# Patient Record
Sex: FEMALE | Race: WHITE | NOT HISPANIC OR LATINO | URBAN - METROPOLITAN AREA
[De-identification: names, ages, dates, MRNs, and addresses within clinical notes are randomized per-mention and may not be internally consistent; named-entity substitution may affect disease eponyms.]

---

## 2017-01-01 ENCOUNTER — COMMUNICATION - HEALTHEAST (OUTPATIENT)
Dept: VASCULAR SURGERY | Facility: CLINIC | Age: 67
End: 2017-01-01

## 2017-01-01 ENCOUNTER — COMMUNICATION - HEALTHEAST (OUTPATIENT)
Dept: FAMILY MEDICINE | Facility: CLINIC | Age: 67
End: 2017-01-01

## 2017-01-01 ENCOUNTER — RECORDS - HEALTHEAST (OUTPATIENT)
Dept: ADMINISTRATIVE | Facility: OTHER | Age: 67
End: 2017-01-01

## 2017-01-01 ENCOUNTER — OFFICE VISIT - HEALTHEAST (OUTPATIENT)
Dept: CARDIOLOGY | Facility: CLINIC | Age: 67
End: 2017-01-01

## 2017-01-01 ENCOUNTER — HOSPITAL ENCOUNTER (OUTPATIENT)
Dept: CARDIOLOGY | Facility: CLINIC | Age: 67
Discharge: HOME | End: 2017-06-29
Attending: SURGERY

## 2017-01-01 ENCOUNTER — OFFICE VISIT - HEALTHEAST (OUTPATIENT)
Dept: VASCULAR SURGERY | Facility: CLINIC | Age: 67
End: 2017-01-01

## 2017-01-01 ENCOUNTER — COMMUNICATION - HEALTHEAST (OUTPATIENT)
Dept: CARDIOLOGY | Facility: CLINIC | Age: 67
End: 2017-01-01

## 2017-01-01 ENCOUNTER — OFFICE VISIT - HEALTHEAST (OUTPATIENT)
Dept: FAMILY MEDICINE | Facility: CLINIC | Age: 67
End: 2017-01-01

## 2017-01-01 ENCOUNTER — RECORDS - HEALTHEAST (OUTPATIENT)
Dept: VASCULAR ULTRASOUND | Facility: CLINIC | Age: 67
End: 2017-01-01

## 2017-01-01 ENCOUNTER — COMMUNICATION - HEALTHEAST (OUTPATIENT)
Dept: CARDIAC REHAB | Facility: CLINIC | Age: 67
End: 2017-01-01

## 2017-01-01 ENCOUNTER — ANESTHESIA - HEALTHEAST (OUTPATIENT)
Dept: SURGERY | Facility: CLINIC | Age: 67
End: 2017-01-01

## 2017-01-01 ENCOUNTER — HOSPITAL ENCOUNTER (OUTPATIENT)
Dept: RADIOLOGY | Facility: CLINIC | Age: 67
Discharge: HOME | End: 2017-08-25
Attending: INTERNAL MEDICINE

## 2017-01-01 ENCOUNTER — AMBULATORY - HEALTHEAST (OUTPATIENT)
Dept: CARDIOLOGY | Facility: CLINIC | Age: 67
End: 2017-01-01

## 2017-01-01 ENCOUNTER — OFFICE VISIT - HEALTHEAST (OUTPATIENT)
Dept: BEHAVIORAL HEALTH | Facility: CLINIC | Age: 67
End: 2017-01-01

## 2017-01-01 ENCOUNTER — SURGERY - HEALTHEAST (OUTPATIENT)
Dept: CARDIOLOGY | Facility: CLINIC | Age: 67
End: 2017-01-01

## 2017-01-01 ENCOUNTER — AMBULATORY - HEALTHEAST (OUTPATIENT)
Dept: FAMILY MEDICINE | Facility: CLINIC | Age: 67
End: 2017-01-01

## 2017-01-01 ENCOUNTER — HOSPITAL ENCOUNTER (OUTPATIENT)
Dept: INTERVENTIONAL RADIOLOGY/VASCULAR | Facility: HOSPITAL | Age: 67
Discharge: HOME | End: 2017-07-28
Attending: SURGERY

## 2017-01-01 ENCOUNTER — HOSPITAL ENCOUNTER (OUTPATIENT)
Dept: CT IMAGING | Facility: CLINIC | Age: 67
Discharge: HOME | End: 2017-06-29
Attending: SURGERY

## 2017-01-01 ENCOUNTER — COMMUNICATION - HEALTHEAST (OUTPATIENT)
Dept: NURSING | Facility: CLINIC | Age: 67
End: 2017-01-01

## 2017-01-01 ENCOUNTER — SURGERY - HEALTHEAST (OUTPATIENT)
Dept: SURGERY | Facility: CLINIC | Age: 67
End: 2017-01-01

## 2017-01-01 ENCOUNTER — AMBULATORY - HEALTHEAST (OUTPATIENT)
Dept: VASCULAR SURGERY | Facility: CLINIC | Age: 67
End: 2017-01-01

## 2017-01-01 DIAGNOSIS — Z98.890 POST-OPERATIVE STATE: ICD-10-CM

## 2017-01-01 DIAGNOSIS — I25.5 ISCHEMIC CARDIOMYOPATHY: ICD-10-CM

## 2017-01-01 DIAGNOSIS — E78.5 HYPERLIPIDEMIA: ICD-10-CM

## 2017-01-01 DIAGNOSIS — S81.001A OPEN KNEE WOUND, RIGHT, INITIAL ENCOUNTER: ICD-10-CM

## 2017-01-01 DIAGNOSIS — I73.9 PERIPHERAL VASCULAR DISEASE, UNSPECIFIED (H): ICD-10-CM

## 2017-01-01 DIAGNOSIS — I77.1 STENOSIS OF LEFT SUBCLAVIAN ARTERY (H): ICD-10-CM

## 2017-01-01 DIAGNOSIS — Z95.828 S/P INSERTION OF ILIAC ARTERY STENT: ICD-10-CM

## 2017-01-01 DIAGNOSIS — T81.49XA SURGICAL WOUND INFECTION: ICD-10-CM

## 2017-01-01 DIAGNOSIS — E78.5 DYSLIPIDEMIA, GOAL LDL BELOW 70: ICD-10-CM

## 2017-01-01 DIAGNOSIS — L29.9 ITCHING: ICD-10-CM

## 2017-01-01 DIAGNOSIS — Z51.89 VISIT FOR WOUND CHECK: ICD-10-CM

## 2017-01-01 DIAGNOSIS — E11.51 TYPE 2 DIABETES MELLITUS WITH DIABETIC PERIPHERAL ANGIOPATHY WITHOUT GANGRENE, WITH LONG-TERM CURRENT USE OF INSULIN (H): ICD-10-CM

## 2017-01-01 DIAGNOSIS — Z86.718 PERSONAL HISTORY OF OTHER VENOUS THROMBOSIS AND EMBOLISM: ICD-10-CM

## 2017-01-01 DIAGNOSIS — I25.10 CORONARY ARTERY DISEASE DUE TO LIPID RICH PLAQUE: ICD-10-CM

## 2017-01-01 DIAGNOSIS — Z87.891 HISTORY OF SMOKING: ICD-10-CM

## 2017-01-01 DIAGNOSIS — I74.3 EMBOLIC DISEASE OF TOE (H): ICD-10-CM

## 2017-01-01 DIAGNOSIS — Z79.4 TYPE 2 DIABETES MELLITUS WITH OTHER CIRCULATORY COMPLICATION, WITH LONG-TERM CURRENT USE OF INSULIN (H): ICD-10-CM

## 2017-01-01 DIAGNOSIS — R06.02 SOB (SHORTNESS OF BREATH): ICD-10-CM

## 2017-01-01 DIAGNOSIS — Z95.1 S/P CABG X 4: ICD-10-CM

## 2017-01-01 DIAGNOSIS — I73.9 PAD (PERIPHERAL ARTERY DISEASE) (H): ICD-10-CM

## 2017-01-01 DIAGNOSIS — E11.59 TYPE 2 DIABETES MELLITUS WITH OTHER CIRCULATORY COMPLICATIONS (H): ICD-10-CM

## 2017-01-01 DIAGNOSIS — Z86.718: ICD-10-CM

## 2017-01-01 DIAGNOSIS — I73.9 CLAUDICATION (H): ICD-10-CM

## 2017-01-01 DIAGNOSIS — I20.0 UNSTABLE ANGINA (H): ICD-10-CM

## 2017-01-01 DIAGNOSIS — S31.109D WOUND OF LEFT GROIN, SUBSEQUENT ENCOUNTER: ICD-10-CM

## 2017-01-01 DIAGNOSIS — I25.83 CORONARY ARTERY DISEASE DUE TO LIPID RICH PLAQUE: ICD-10-CM

## 2017-01-01 DIAGNOSIS — F33.1 MODERATE EPISODE OF RECURRENT MAJOR DEPRESSIVE DISORDER (H): ICD-10-CM

## 2017-01-01 DIAGNOSIS — Z95.1 S/P CABG X 3: ICD-10-CM

## 2017-01-01 DIAGNOSIS — I49.5 SINUS NODE DYSFUNCTION (H): ICD-10-CM

## 2017-01-01 DIAGNOSIS — R06.00 DYSPNEA, UNSPECIFIED TYPE: ICD-10-CM

## 2017-01-01 DIAGNOSIS — R06.02 SHORTNESS OF BREATH: ICD-10-CM

## 2017-01-01 DIAGNOSIS — Z79.4 TYPE 2 DIABETES MELLITUS WITH DIABETIC PERIPHERAL ANGIOPATHY WITHOUT GANGRENE, WITH LONG-TERM CURRENT USE OF INSULIN (H): ICD-10-CM

## 2017-01-01 DIAGNOSIS — S31.109D RIGHT GROIN WOUND, SUBSEQUENT ENCOUNTER: ICD-10-CM

## 2017-01-01 DIAGNOSIS — E87.8 ELECTROLYTE ABNORMALITY: ICD-10-CM

## 2017-01-01 DIAGNOSIS — S31.109A RIGHT GROIN WOUND: ICD-10-CM

## 2017-01-01 DIAGNOSIS — I50.21 ACUTE SYSTOLIC HEART FAILURE (H): ICD-10-CM

## 2017-01-01 DIAGNOSIS — T81.328A STERNAL WOUND DEHISCENCE, INITIAL ENCOUNTER: ICD-10-CM

## 2017-01-01 DIAGNOSIS — D62 ACUTE BLOOD LOSS AS CAUSE OF POSTOPERATIVE ANEMIA: ICD-10-CM

## 2017-01-01 DIAGNOSIS — Z51.89 ENCOUNTER FOR WOUND RE-CHECK: ICD-10-CM

## 2017-01-01 DIAGNOSIS — E11.59 TYPE 2 DIABETES MELLITUS WITH OTHER CIRCULATORY COMPLICATION, WITH LONG-TERM CURRENT USE OF INSULIN (H): ICD-10-CM

## 2017-01-01 DIAGNOSIS — T81.321A ABDOMINAL WOUND DEHISCENCE, INITIAL ENCOUNTER: ICD-10-CM

## 2017-01-01 DIAGNOSIS — I42.9 CARDIOMYOPATHY (H): ICD-10-CM

## 2017-01-01 DIAGNOSIS — L85.3 XEROSIS CUTIS: ICD-10-CM

## 2017-01-01 DIAGNOSIS — Z98.890 HISTORY OF EMBOLECTOMY: ICD-10-CM

## 2017-01-01 LAB
AORTIC ROOT: 2.9 CM
AORTIC VALVE MEAN VELOCITY: 88.3 CM/S
ASCENDING AORTA: 3.3 CM
ATRIAL RATE - MUSE: 65 BPM
AV DIMENSIONLESS INDEX VTI: 0.5
AV MEAN GRADIENT: 3 MMHG
AV VALVE AREA: 1.3 CM2
BSA FOR ECHO PROCEDURE: 1.85 M2
DIASTOLIC BLOOD PRESSURE - MUSE: NORMAL MMHG
DOP CALC AO PEAK VEL: 126 CM/S
DOP CALC AO PEAK VEL: 126 CM/S
DOP CALC AO VTI: 30.7 CM
DOP CALC LVOT AREA: 2.83 CM2
DOP CALC LVOT DIAMETER: 1.9 CM
DOP CALC LVOT PEAK VEL: 64.7 CM/S
DOP CALC LVOT STROKE VOLUME: 40.8 CM3
DOP CALCLVOT PEAK VEL VTI: 14.4 CM
ECHO EJECTION FRACTION ESTIMATED: 40 %
EJECTION FRACTION: 34 % (ref 55–75)
FRACTIONAL SHORTENING: 25 % (ref 28–44)
HBA1C MFR BLD: 6.9 % (ref 3.5–6)
INTERPRETATION ECG - MUSE: NORMAL
INTERVENTRICULAR SEPTUM IN END DIASTOLE: 1.3 CM (ref 0.6–0.9)
IVS/PW RATIO: 1
LA AREA 1: 18.4 CM2
LA AREA 2: 18.2 CM2
LEFT ATRIUM LENGTH: 5.12 CM
LEFT ATRIUM SIZE: 3.2 CM
LEFT ATRIUM VOLUME INDEX: 30.1 ML/M2
LEFT ATRIUM VOLUME: 55.6 CM3
LEFT VENTRICLE DIASTOLIC VOLUME INDEX: 61.6 CM3/M2 (ref 34–74)
LEFT VENTRICLE DIASTOLIC VOLUME: 114 CM3 (ref 46–106)
LEFT VENTRICLE MASS INDEX: 150.5 G/M2
LEFT VENTRICLE SYSTOLIC VOLUME INDEX: 40.5 CM3/M2 (ref 11–31)
LEFT VENTRICLE SYSTOLIC VOLUME: 75 CM3 (ref 14–42)
LEFT VENTRICULAR INTERNAL DIMENSION IN DIASTOLE: 5.2 CM (ref 3.8–5.2)
LEFT VENTRICULAR INTERNAL DIMENSION IN SYSTOLE: 3.9 CM (ref 2.2–3.5)
LEFT VENTRICULAR MASS: 278.4 G
LEFT VENTRICULAR OUTFLOW TRACT MEAN GRADIENT: 1 MMHG
LEFT VENTRICULAR OUTFLOW TRACT MEAN VELOCITY: 42.2 CM/S
LEFT VENTRICULAR OUTFLOW TRACT PEAK GRADIENT: 2 MMHG
LEFT VENTRICULAR POSTERIOR WALL IN END DIASTOLE: 1.3 CM (ref 0.6–0.9)
LV STROKE VOLUME INDEX: 22.1 ML/M2
MV DECELERATION TIME: 243 MS
MV DECELERATION TIME: 243 MS
MV E'TISSUE VEL-LAT: 2.92 CM/S
MV E'TISSUE VEL-LAT: 2.92 CM/S
MV E'TISSUE VEL-MED: 4.58 CM/S
MV E'TISSUE VEL-MED: 4.58 CM/S
MV PEAK A VELOCITY: 108 CM/S
MV PEAK A VELOCITY: 108 CM/S
MV PEAK E VELOCITY: 82.4 CM/S
MV PEAK E VELOCITY: 82.4 CM/S
P AXIS - MUSE: NORMAL DEGREES
PR INTERVAL - MUSE: 126 MS
QRS DURATION - MUSE: 100 MS
QT - MUSE: 442 MS
QTC - MUSE: 459 MS
R AXIS - MUSE: 38 DEGREES
SYSTOLIC BLOOD PRESSURE - MUSE: NORMAL MMHG
T AXIS - MUSE: 163 DEGREES
TRICUSPID VALVE ANULAR PLANE SYSTOLIC EXCURSION: 1.1 CM
VENTRICULAR RATE- MUSE: 65 BPM

## 2017-01-01 RX ORDER — ALBUTEROL SULFATE 90 UG/1
2 AEROSOL, METERED RESPIRATORY (INHALATION) EVERY 6 HOURS PRN
Qty: 18 G | Refills: 11 | Status: SHIPPED | OUTPATIENT
Start: 2017-01-01

## 2017-01-01 ASSESSMENT — MIFFLIN-ST. JEOR
SCORE: 1329.21
SCORE: 1294.74
SCORE: 1294.74
SCORE: 1322.4
SCORE: 1399.97
SCORE: 1309.71
SCORE: 1308.35
SCORE: 1347.92
SCORE: 1301.09
SCORE: 1368.22
SCORE: 1333.29
SCORE: 1301.54
SCORE: 1262.99
SCORE: 1309.71
SCORE: 1301.54
SCORE: 1382.13
SCORE: 1399.97
SCORE: 1381.13
SCORE: 1342.82
SCORE: 1368.22
SCORE: 1396.13
SCORE: 1336.47
SCORE: 1306.99
SCORE: 1396.13
SCORE: 1309.25
SCORE: 1381.13
SCORE: 1306.53
SCORE: 1362.78
SCORE: 1279.32
SCORE: 1252.1
SCORE: 1382.13
SCORE: 1309.25
SCORE: 1381.13
SCORE: 1362.78
SCORE: 1347.92
SCORE: 1301.09
SCORE: 1407.13
SCORE: 1407.13
SCORE: 1357.79
SCORE: 1301.09
SCORE: 1315.6
SCORE: 1308.35
SCORE: 1368.22
SCORE: 1369.58
SCORE: 1280.68
SCORE: 1326.04
SCORE: 1301.54
SCORE: 1342.82
SCORE: 1336.47
SCORE: 1399.97
SCORE: 1330.06
SCORE: 1353.25
SCORE: 1294.74
SCORE: 1339.65
SCORE: 1259.81
SCORE: 1339.65
SCORE: 1309.71
SCORE: 1353.71
SCORE: 1357.79
SCORE: 1306.99
SCORE: 1262.08
SCORE: 1396.13
SCORE: 1326.04
SCORE: 1353.71
SCORE: 1321.5
SCORE: 1326.04
SCORE: 1309.25
SCORE: 1330.06
SCORE: 1306.99
SCORE: 1308.35
SCORE: 1347.92
SCORE: 1330.06
SCORE: 1353.71
SCORE: 1407.13
SCORE: 1370.04
SCORE: 1362.78
SCORE: 1382.13
SCORE: 1336.47
SCORE: 1321.5
SCORE: 1342.82
SCORE: 1321.5
SCORE: 1357.79
SCORE: 1339.65

## 2017-01-27 ENCOUNTER — COMMUNICATION - HEALTHEAST (OUTPATIENT)
Dept: FAMILY MEDICINE | Facility: CLINIC | Age: 67
End: 2017-01-27

## 2017-01-27 DIAGNOSIS — I25.10 ATHEROSCLEROSIS OF NATIVE CORONARY ARTERY OF NATIVE HEART WITHOUT ANGINA PECTORIS: ICD-10-CM

## 2017-02-03 ENCOUNTER — COMMUNICATION - HEALTHEAST (OUTPATIENT)
Dept: FAMILY MEDICINE | Facility: CLINIC | Age: 67
End: 2017-02-03

## 2017-02-03 DIAGNOSIS — E78.5 HYPERLIPIDEMIA: ICD-10-CM

## 2017-02-03 DIAGNOSIS — F32.A DEPRESSION: ICD-10-CM

## 2017-03-12 ENCOUNTER — COMMUNICATION - HEALTHEAST (OUTPATIENT)
Dept: FAMILY MEDICINE | Facility: CLINIC | Age: 67
End: 2017-03-12

## 2017-03-20 ENCOUNTER — COMMUNICATION - HEALTHEAST (OUTPATIENT)
Dept: FAMILY MEDICINE | Facility: CLINIC | Age: 67
End: 2017-03-20

## 2017-03-20 DIAGNOSIS — E11.9 DIABETES (H): ICD-10-CM

## 2017-03-20 DIAGNOSIS — F33.2 SEVERE EPISODE OF RECURRENT MAJOR DEPRESSIVE DISORDER (H): ICD-10-CM

## 2017-03-20 DIAGNOSIS — E11.9 TYPE 2 DIABETES MELLITUS (H): ICD-10-CM

## 2017-03-29 ENCOUNTER — OFFICE VISIT - HEALTHEAST (OUTPATIENT)
Dept: FAMILY MEDICINE | Facility: CLINIC | Age: 67
End: 2017-03-29

## 2017-03-29 ENCOUNTER — AMBULATORY - HEALTHEAST (OUTPATIENT)
Dept: FAMILY MEDICINE | Facility: CLINIC | Age: 67
End: 2017-03-29

## 2017-03-29 DIAGNOSIS — E11.59 TYPE 2 DIABETES MELLITUS WITH OTHER CIRCULATORY COMPLICATION, WITH LONG-TERM CURRENT USE OF INSULIN (H): ICD-10-CM

## 2017-03-29 DIAGNOSIS — Z79.4 TYPE 2 DIABETES MELLITUS WITH OTHER CIRCULATORY COMPLICATION, WITH LONG-TERM CURRENT USE OF INSULIN (H): ICD-10-CM

## 2017-03-29 DIAGNOSIS — I25.10 ATHEROSCLEROSIS OF NATIVE CORONARY ARTERY OF NATIVE HEART WITHOUT ANGINA PECTORIS: ICD-10-CM

## 2017-03-29 DIAGNOSIS — F33.2 SEVERE EPISODE OF RECURRENT MAJOR DEPRESSIVE DISORDER (H): ICD-10-CM

## 2017-03-29 DIAGNOSIS — J98.8 CONGESTION OF UPPER AIRWAY: ICD-10-CM

## 2017-03-29 DIAGNOSIS — M19.90 OSTEOARTHRITIS: ICD-10-CM

## 2017-03-29 LAB
CHOLEST SERPL-MCNC: 145 MG/DL
FASTING STATUS PATIENT QL REPORTED: YES
HBA1C MFR BLD: 7.6 % (ref 3.5–6)
HDLC SERPL-MCNC: 47 MG/DL
LDLC SERPL CALC-MCNC: 79 MG/DL
TRIGL SERPL-MCNC: 95 MG/DL

## 2017-03-29 ASSESSMENT — MIFFLIN-ST. JEOR: SCORE: 1350.98

## 2017-03-29 NOTE — ASSESSMENT & PLAN NOTE
Has seen psychiatry in the distant past. I think she would benefit from consulting with them again, as here depression sx of moderately severe.  If they find a good regimen for her, I would be happy to write ongoing prescriptions in the future.  I will continue current meds until she sees psych.

## 2017-03-29 NOTE — ASSESSMENT & PLAN NOTE
Stable.  S/pcardiology study, now no longer on meds for this.  Continue asa, bp control, statin. She is a non-smoker.

## 2017-03-30 ENCOUNTER — COMMUNICATION - HEALTHEAST (OUTPATIENT)
Dept: FAMILY MEDICINE | Facility: CLINIC | Age: 67
End: 2017-03-30

## 2017-04-18 ENCOUNTER — OFFICE VISIT - HEALTHEAST (OUTPATIENT)
Dept: BEHAVIORAL HEALTH | Facility: CLINIC | Age: 67
End: 2017-04-18

## 2017-04-18 DIAGNOSIS — F33.1 MODERATE EPISODE OF RECURRENT MAJOR DEPRESSIVE DISORDER (H): ICD-10-CM

## 2017-10-27 NOTE — ASSESSMENT & PLAN NOTE
Clinically stable.  BNP is still high, but in general is decreasing. Checking labs,clarified meds.  Was out of Plavix.

## 2018-01-01 ENCOUNTER — COMMUNICATION - HEALTHEAST (OUTPATIENT)
Dept: FAMILY MEDICINE | Facility: CLINIC | Age: 68
End: 2018-01-01

## 2018-01-01 ENCOUNTER — COMMUNICATION - HEALTHEAST (OUTPATIENT)
Dept: HOME HEALTH SERVICES | Facility: HOME HEALTH | Age: 68
End: 2018-01-01

## 2018-01-01 ENCOUNTER — HOME CARE/HOSPICE - HEALTHEAST (OUTPATIENT)
Dept: HOME HEALTH SERVICES | Facility: HOME HEALTH | Age: 68
End: 2018-01-01

## 2018-01-01 ENCOUNTER — HOME CARE/HOSPICE - HEALTHEAST (OUTPATIENT)
Dept: HOSPICE | Facility: HOSPICE | Age: 68
End: 2018-01-01

## 2018-01-01 ENCOUNTER — COMMUNICATION - HEALTHEAST (OUTPATIENT)
Dept: SCHEDULING | Facility: CLINIC | Age: 68
End: 2018-01-01

## 2018-01-01 ENCOUNTER — RECORDS - HEALTHEAST (OUTPATIENT)
Dept: INTENSIVE CARE | Facility: CLINIC | Age: 68
End: 2018-01-01

## 2018-01-01 ENCOUNTER — ANESTHESIA - HEALTHEAST (OUTPATIENT)
Dept: INTENSIVE CARE | Facility: CLINIC | Age: 68
End: 2018-01-01

## 2018-01-01 ENCOUNTER — OFFICE VISIT - HEALTHEAST (OUTPATIENT)
Dept: FAMILY MEDICINE | Facility: CLINIC | Age: 68
End: 2018-01-01

## 2018-01-01 ENCOUNTER — AMBULATORY - HEALTHEAST (OUTPATIENT)
Dept: FAMILY MEDICINE | Facility: CLINIC | Age: 68
End: 2018-01-01

## 2018-01-01 ENCOUNTER — COMMUNICATION - HEALTHEAST (OUTPATIENT)
Dept: OCCUPATIONAL THERAPY | Facility: CLINIC | Age: 68
End: 2018-01-01

## 2018-01-01 ENCOUNTER — SURGERY - HEALTHEAST (OUTPATIENT)
Dept: CARDIOLOGY | Facility: CLINIC | Age: 68
End: 2018-01-01

## 2018-01-01 DIAGNOSIS — M79.606 LEG PAIN: ICD-10-CM

## 2018-01-01 DIAGNOSIS — N61.0 CELLULITIS OF BREAST: ICD-10-CM

## 2018-01-01 DIAGNOSIS — R53.83 FATIGUE: ICD-10-CM

## 2018-01-01 DIAGNOSIS — R41.3 MEMORY LOSS DUE TO MEDICAL CONDITION: ICD-10-CM

## 2018-01-01 DIAGNOSIS — F33.2 SEVERE EPISODE OF RECURRENT MAJOR DEPRESSIVE DISORDER, WITHOUT PSYCHOTIC FEATURES (H): ICD-10-CM

## 2018-01-01 DIAGNOSIS — Z79.4 TYPE 2 DIABETES MELLITUS WITH DIABETIC PERIPHERAL ANGIOPATHY WITHOUT GANGRENE, WITH LONG-TERM CURRENT USE OF INSULIN (H): ICD-10-CM

## 2018-01-01 DIAGNOSIS — E11.51 TYPE 2 DIABETES MELLITUS WITH DIABETIC PERIPHERAL ANGIOPATHY WITHOUT GANGRENE, WITH LONG-TERM CURRENT USE OF INSULIN (H): ICD-10-CM

## 2018-01-01 DIAGNOSIS — Z91.148 POOR COMPLIANCE WITH MEDICATION: ICD-10-CM

## 2018-01-01 DIAGNOSIS — F32.A DEPRESSIVE DISORDER: ICD-10-CM

## 2018-01-01 DIAGNOSIS — F33.2 SEVERE EPISODE OF RECURRENT MAJOR DEPRESSIVE DISORDER (H): ICD-10-CM

## 2018-01-01 DIAGNOSIS — S99.929A INJURY OF TOE: ICD-10-CM

## 2018-01-01 DIAGNOSIS — F32.A DEPRESSION: ICD-10-CM

## 2018-01-01 DIAGNOSIS — E78.5 HYPERLIPIDEMIA: ICD-10-CM

## 2018-01-01 DIAGNOSIS — M79.604 PAIN IN BOTH LOWER EXTREMITIES: ICD-10-CM

## 2018-01-01 DIAGNOSIS — D64.9 ANEMIA: ICD-10-CM

## 2018-01-01 DIAGNOSIS — S99.922A INJURY OF TOE ON LEFT FOOT, INITIAL ENCOUNTER: ICD-10-CM

## 2018-01-01 DIAGNOSIS — M79.605 PAIN IN BOTH LOWER EXTREMITIES: ICD-10-CM

## 2018-01-01 LAB
ALBUMIN SERPL-MCNC: 3 G/DL (ref 3.5–5)
ALP SERPL-CCNC: 164 U/L (ref 45–120)
ALT SERPL W P-5'-P-CCNC: 123 U/L (ref 0–45)
ANION GAP SERPL CALCULATED.3IONS-SCNC: 10 MMOL/L (ref 5–18)
AST SERPL W P-5'-P-CCNC: 28 U/L (ref 0–40)
BASOPHILS # BLD AUTO: 0 THOU/UL (ref 0–0.2)
BASOPHILS NFR BLD AUTO: 0 % (ref 0–2)
BILIRUB DIRECT SERPL-MCNC: 0.3 MG/DL
BILIRUB SERPL-MCNC: 0.6 MG/DL (ref 0–1)
BUN SERPL-MCNC: 23 MG/DL (ref 8–22)
CALCIUM SERPL-MCNC: 8.2 MG/DL (ref 8.5–10.5)
CHLORIDE BLD-SCNC: 98 MMOL/L (ref 98–107)
CO2 SERPL-SCNC: 27 MMOL/L (ref 22–31)
CREAT SERPL-MCNC: 0.89 MG/DL (ref 0.6–1.1)
EOSINOPHIL # BLD AUTO: 0.1 THOU/UL (ref 0–0.4)
EOSINOPHIL NFR BLD AUTO: 1 % (ref 0–6)
ERYTHROCYTE [DISTWIDTH] IN BLOOD BY AUTOMATED COUNT: 15.8 % (ref 11–14.5)
ERYTHROCYTE [DISTWIDTH] IN BLOOD BY AUTOMATED COUNT: 19.5 % (ref 11–14.5)
FERRITIN SERPL-MCNC: 32 NG/ML (ref 10–130)
GFR SERPL CREATININE-BSD FRML MDRD: >60 ML/MIN/1.73M2
GLUCOSE BLD-MCNC: 62 MG/DL (ref 70–125)
HBA1C MFR BLD: 8.1 % (ref 3.5–6)
HCT VFR BLD AUTO: 27.4 % (ref 35–47)
HCT VFR BLD AUTO: 27.7 % (ref 35–47)
HGB BLD-MCNC: 8.3 G/DL (ref 12–16)
HGB BLD-MCNC: 8.8 G/DL (ref 12–16)
LAB AP CHARGES (HE HISTORICAL CONVERSION): NORMAL
LYMPHOCYTES # BLD AUTO: 1.2 THOU/UL (ref 0.8–4.4)
LYMPHOCYTES NFR BLD AUTO: 12 % (ref 20–40)
MCH RBC QN AUTO: 22.5 PG (ref 27–34)
MCH RBC QN AUTO: 22.8 PG (ref 27–34)
MCHC RBC AUTO-ENTMCNC: 30 G/DL (ref 32–36)
MCHC RBC AUTO-ENTMCNC: 32 G/DL (ref 32–36)
MCV RBC AUTO: 70 FL (ref 80–100)
MCV RBC AUTO: 76 FL (ref 80–100)
MONOCYTES # BLD AUTO: 0.6 THOU/UL (ref 0–0.9)
MONOCYTES NFR BLD AUTO: 6 % (ref 2–10)
NEUTROPHILS # BLD AUTO: 8.2 THOU/UL (ref 2–7.7)
NEUTROPHILS NFR BLD AUTO: 81 % (ref 50–70)
OVALOCYTES: ABNORMAL
PATH REPORT.COMMENTS IMP SPEC: NORMAL
PATH REPORT.COMMENTS IMP SPEC: NORMAL
PATH REPORT.FINAL DX SPEC: NORMAL
PATH REPORT.MICROSCOPIC SPEC OTHER STN: ABNORMAL
PATH REPORT.RELEVANT HX SPEC: NORMAL
PLAT MORPH BLD: NORMAL
PLATELET # BLD AUTO: 349 THOU/UL (ref 140–440)
PLATELET # BLD AUTO: 355 THOU/UL (ref 140–440)
PMV BLD AUTO: 10.6 FL (ref 8.5–12.5)
PMV BLD AUTO: 8.1 FL (ref 7–10)
POTASSIUM BLD-SCNC: 3.5 MMOL/L (ref 3.5–5)
PROT SERPL-MCNC: 6.3 G/DL (ref 6–8)
RBC # BLD AUTO: 3.64 MILL/UL (ref 3.8–5.4)
RBC # BLD AUTO: 3.88 MILL/UL (ref 3.8–5.4)
SODIUM SERPL-SCNC: 135 MMOL/L (ref 136–145)
TSH SERPL DL<=0.005 MIU/L-ACNC: 0.59 UIU/ML (ref 0.3–5)
VIT B12 SERPL-MCNC: >2000 PG/ML (ref 213–816)
WBC: 10.3 THOU/UL (ref 4–11)
WBC: 9.6 THOU/UL (ref 4–11)

## 2018-01-01 ASSESSMENT — MIFFLIN-ST. JEOR
SCORE: 1207.65
SCORE: 1279.77
SCORE: 1216.72
SCORE: 1198.58
SCORE: 1260.72
SCORE: 1214

## 2018-04-13 NOTE — ASSESSMENT & PLAN NOTE
Inadequate diabetes control, as noted in medication noncompliance problem. Homecare can help evaluate.  Daughter wonders about oral meds, but she is already on a lotof pills and she had been very successfully controlled when compliant in the past.  Will see how things go once homecare initiated.    I did send Lantus pen instead of vials today, hoping that will make insulin easier.

## 2018-04-13 NOTE — ASSESSMENT & PLAN NOTE
Some of the patient's symptoms may be due to severe uncontrolled depression today.  She is on an unusual regimen of Cymbalta plus Effexor and may benefit from a switch.  At like to see how everything goes when her anemia, etc. are addressed and other causes ruled out.  Therefore, no meds changed today.

## 2018-04-13 NOTE — ASSESSMENT & PLAN NOTE
Multifactorial today with diabetic neuropathy, peripheral vascular dz, and now toenail injury.  She inquires about medical marijuana, which could be considered. I will discuss with our pharmacist regarding options, given her current medication regimen.

## 2018-04-13 NOTE — ASSESSMENT & PLAN NOTE
Suspect iron deficiency from hemorrhoidal blood loss and possibly never got replaced after acute blood loss anemia last fall. Recommend starting OTC iron supplement Floradix. Daughter given info and will .

## 2021-05-25 ENCOUNTER — RECORDS - HEALTHEAST (OUTPATIENT)
Dept: ADMINISTRATIVE | Facility: CLINIC | Age: 71
End: 2021-05-25

## 2021-05-27 ENCOUNTER — RECORDS - HEALTHEAST (OUTPATIENT)
Dept: ADMINISTRATIVE | Facility: CLINIC | Age: 71
End: 2021-05-27

## 2021-05-28 ENCOUNTER — RECORDS - HEALTHEAST (OUTPATIENT)
Dept: ADMINISTRATIVE | Facility: CLINIC | Age: 71
End: 2021-05-28

## 2021-05-29 ENCOUNTER — RECORDS - HEALTHEAST (OUTPATIENT)
Dept: ADMINISTRATIVE | Facility: CLINIC | Age: 71
End: 2021-05-29

## 2021-05-30 ENCOUNTER — RECORDS - HEALTHEAST (OUTPATIENT)
Dept: ADMINISTRATIVE | Facility: CLINIC | Age: 71
End: 2021-05-30

## 2021-05-30 VITALS — HEIGHT: 63 IN | WEIGHT: 188.5 LBS | BODY MASS INDEX: 33.4 KG/M2

## 2021-05-31 ENCOUNTER — RECORDS - HEALTHEAST (OUTPATIENT)
Dept: ADMINISTRATIVE | Facility: CLINIC | Age: 71
End: 2021-05-31

## 2021-05-31 VITALS — HEIGHT: 63 IN | BODY MASS INDEX: 30.53 KG/M2 | WEIGHT: 172.3 LBS

## 2021-05-31 VITALS — BODY MASS INDEX: 32.45 KG/M2 | WEIGHT: 183.19 LBS

## 2021-05-31 VITALS — WEIGHT: 178.1 LBS | BODY MASS INDEX: 31.55 KG/M2

## 2021-05-31 VITALS — BODY MASS INDEX: 30.48 KG/M2 | WEIGHT: 172 LBS | HEIGHT: 63 IN

## 2021-05-31 VITALS — HEIGHT: 63 IN | BODY MASS INDEX: 31.54 KG/M2 | WEIGHT: 178 LBS

## 2021-05-31 VITALS — HEIGHT: 63 IN | WEIGHT: 168.2 LBS | BODY MASS INDEX: 29.8 KG/M2

## 2021-05-31 VITALS — WEIGHT: 185.3 LBS | BODY MASS INDEX: 32.82 KG/M2

## 2021-05-31 VITALS — WEIGHT: 198.6 LBS | BODY MASS INDEX: 35.18 KG/M2

## 2021-05-31 VITALS
HEIGHT: 63 IN | BODY MASS INDEX: 33.94 KG/M2 | BODY MASS INDEX: 32.77 KG/M2 | BODY MASS INDEX: 32.95 KG/M2 | HEIGHT: 63 IN | BODY MASS INDEX: 32.77 KG/M2 | WEIGHT: 186 LBS | WEIGHT: 191.6 LBS

## 2021-05-31 VITALS — WEIGHT: 194.45 LBS | BODY MASS INDEX: 34.44 KG/M2

## 2021-05-31 VITALS
WEIGHT: 183.19 LBS | HEIGHT: 63 IN | BODY MASS INDEX: 32.46 KG/M2 | HEIGHT: 63 IN | BODY MASS INDEX: 32.46 KG/M2 | HEIGHT: 63 IN | BODY MASS INDEX: 32.46 KG/M2 | WEIGHT: 183.19 LBS | WEIGHT: 183.19 LBS

## 2021-05-31 VITALS — BODY MASS INDEX: 31.64 KG/M2 | WEIGHT: 178.6 LBS

## 2021-05-31 VITALS — BODY MASS INDEX: 33.37 KG/M2 | WEIGHT: 188.4 LBS

## 2021-05-31 VITALS — BODY MASS INDEX: 31.6 KG/M2 | WEIGHT: 176.9 LBS | BODY MASS INDEX: 31.34 KG/M2 | WEIGHT: 178.4 LBS

## 2021-05-31 VITALS — WEIGHT: 178.7 LBS | BODY MASS INDEX: 31.66 KG/M2

## 2021-05-31 VITALS — WEIGHT: 175.4 LBS | BODY MASS INDEX: 31.07 KG/M2

## 2021-05-31 VITALS — BODY MASS INDEX: 34.02 KG/M2 | HEIGHT: 63 IN | WEIGHT: 192 LBS

## 2021-05-31 VITALS — WEIGHT: 184.6 LBS | BODY MASS INDEX: 32.7 KG/M2

## 2021-05-31 VITALS — WEIGHT: 182.3 LBS | BODY MASS INDEX: 32.29 KG/M2

## 2021-05-31 VITALS — BODY MASS INDEX: 29.41 KG/M2 | HEIGHT: 63 IN | WEIGHT: 166 LBS

## 2021-05-31 VITALS — BODY MASS INDEX: 33.53 KG/M2 | WEIGHT: 189.3 LBS

## 2021-05-31 VITALS — BODY MASS INDEX: 34.04 KG/M2 | WEIGHT: 185 LBS | HEIGHT: 62 IN

## 2021-05-31 VITALS — WEIGHT: 194.67 LBS | BODY MASS INDEX: 34.48 KG/M2

## 2021-05-31 VITALS — HEIGHT: 63 IN | WEIGHT: 180 LBS | BODY MASS INDEX: 31.89 KG/M2

## 2021-05-31 VITALS — WEIGHT: 197.75 LBS | BODY MASS INDEX: 35.03 KG/M2

## 2021-05-31 VITALS — HEIGHT: 63 IN | WEIGHT: 183 LBS | BODY MASS INDEX: 32.43 KG/M2

## 2021-05-31 VITALS
HEIGHT: 63 IN | BODY MASS INDEX: 34.48 KG/M2 | HEIGHT: 63 IN | BODY MASS INDEX: 35.46 KG/M2 | WEIGHT: 200.18 LBS | BODY MASS INDEX: 34.48 KG/M2

## 2021-05-31 VITALS — WEIGHT: 190.4 LBS | BODY MASS INDEX: 33.73 KG/M2

## 2021-05-31 VITALS — WEIGHT: 187.1 LBS | BODY MASS INDEX: 34.43 KG/M2 | HEIGHT: 62 IN

## 2021-05-31 VITALS — BODY MASS INDEX: 31.32 KG/M2 | WEIGHT: 176.8 LBS

## 2021-05-31 VITALS — WEIGHT: 167.7 LBS | HEIGHT: 63 IN | BODY MASS INDEX: 29.71 KG/M2

## 2021-05-31 VITALS — BODY MASS INDEX: 29.84 KG/M2 | HEIGHT: 63 IN | WEIGHT: 168.4 LBS

## 2021-05-31 VITALS — WEIGHT: 181.3 LBS | BODY MASS INDEX: 32.12 KG/M2

## 2021-05-31 VITALS — BODY MASS INDEX: 33.15 KG/M2 | WEIGHT: 187.13 LBS

## 2021-06-01 ENCOUNTER — RECORDS - HEALTHEAST (OUTPATIENT)
Dept: ADMINISTRATIVE | Facility: CLINIC | Age: 71
End: 2021-06-01

## 2021-06-01 VITALS — HEIGHT: 63 IN | BODY MASS INDEX: 29.41 KG/M2

## 2021-06-01 VITALS — WEIGHT: 172.1 LBS | HEIGHT: 63 IN | BODY MASS INDEX: 30.49 KG/M2

## 2021-06-01 VITALS — BODY MASS INDEX: 27.5 KG/M2 | WEIGHT: 155.25 LBS

## 2021-06-02 ENCOUNTER — RECORDS - HEALTHEAST (OUTPATIENT)
Dept: ADMINISTRATIVE | Facility: CLINIC | Age: 71
End: 2021-06-02

## 2021-06-09 NOTE — PROGRESS NOTES
ASSESSMENT/PLAN:    Problem List Items Addressed This Visit     Type 2 diabetes mellitus with circulatory disorder, with long-term current use of insulin - Primary     Fair control on current regimen, with A1C today 7.6.  It sounds like she is still getting some hypoglycemia and then over-treating with too much sugar.  She will try to drink less juice for hypoglycemai to try to avoid erratic blood sugars.  I would like to see her again in 4-6 months. I would normally prefer 3m f/u, but pt prefers to extend as long as possible.         Relevant Orders    Ambulatory referral to Ophthalmology    Coronary Artery Disease     Stable.  S/p cardiology study, now no longer on meds for this.  Continue asa, bp control, statin. She is a non-smoker.         Severe Recurrent Major Depression     Has seen psychiatry in the distant past. I think she would benefit from consulting with them again, as here depression sx of moderately severe.  If they find a good regimen for her, I would be happy to write ongoing prescriptions in the future.  I will continue current meds until she sees psych.         Relevant Orders    Ambulatory referral to Psychiatry    Osteoarthritis    Relevant Medications    acetaminophen-codeine (TYLENOL #3) 300-30 mg per tablet      Other Visit Diagnoses     Congestion of upper airway        Seems reactive.  Recommend nasal saline rinses.  If sx continue, she may call and would try antibiotics for possible sinusitis, as she has had sx x2 weeks.              SUBJECTIVE:  Bety Escobar is a 66 y.o. female here for diabetes follow-up and other concerns as below.    Congestion x2 weeks. Having work done on house. No hx allergies or asthma.  Stuffy nose, congestion of face and chest.  Hasn't taken meds for it.  Not truly short of breath. Little cough.    Diabetes:  Tests infrequently due to expense of test strips. Gets low blood sugars about once per week.  Drinks a large amount of juice to get blood sugar up fast.   She's really worried about critical low, as she had this happen in the past.    Fingers and feet numb - chronic.  Left knee pain - chronic.  Worse at night, interferes with sleep.   T#3 helped in past.    Saw podiatrist and was diagnosed with hammer toes, but not interested in surgery.    No recent eye exam.    PHQ-2 Total Score: 4 (3/29/2017  8:00 AM)   PHQ-9 Total Score: 17 (3/29/2017  8:00 AM)  Little interest or pleasure in doing things: More than half the days  Feeling down, depressed, or hopeless: More than half the days  Trouble falling or staying asleep, or sleeping too much: Nearly every day  Feeling tired or having little energy: Nearly every day  Poor appetite or overeating: More than half the days  Feeling bad about yourself - or that you are a failure or have let yourself or your family down: Nearly every day  Trouble concentrating on things, such as reading the newspaper or watching television: Several days  Moving or speaking so slowly that other people could have noticed. Or the opposite - being so fidgety or restless that you have been moving around a lot more than usual: Several days  Thoughts that you would be better off dead, or of hurting yourself in some way: Not at all  PHQ-9 Total Score: 17  If you checked off any problems, how difficult have these problems made it for you to do your work, take care of things at home, or get along with other people?: Somewhat difficult     Patient Active Problem List   Diagnosis     Anemia     Insomnia     Peripheral Neuropathy     Microalbuminuria     Joint Pain, Localized In The Knee     Hyperlipidemia     Osteoarthritis     Coronary Artery Disease     Type 2 diabetes mellitus with circulatory disorder, with long-term current use of insulin     Peripheral Vascular Disease     Severe Recurrent Major Depression     Hammer toes of both feet     Current Outpatient Prescriptions on File Prior to Visit   Medication Sig Dispense Refill     aspirin 81 MG EC tablet  "Take 81 mg by mouth daily.       atorvastatin (LIPITOR) 80 MG tablet TAKE 1 TABLET BY MOUTH EVERY DAY 90 tablet 3     BD INSULIN PEN NEEDLE UF SHORT 31 gauge x 5/16\" Ndle USE TO INJECT INSULIN THREE TIMES DAILY WITH MEALS 100 each 11     blood glucose control, normal Soln Use As Directed 3 (three) times a day. TRUEtest Control Level 1 In Vitro Liquid       BLOOD SUGAR DIAGNOSTIC (ONETOUCH ULTRA TEST MISC) Use 1 strip As Directed 4 (four) times a day.       buPROPion (WELLBUTRIN SR) 150 MG 12 hr tablet TAKE 1 TABLET BY MOUTH TWICE DAILY 180 tablet 3     carvedilol (COREG) 3.125 MG tablet TAKE 1 TABLET BY MOUTH TWICE DAILY 180 tablet 3     DULoxetine (CYMBALTA) 60 MG capsule TAKE 1 CAPSULE BY MOUTH DAILY 90 capsule 10     furosemide (LASIX) 20 MG tablet TAKE 1 TABLET BY MOUTH EVERY DAY 90 tablet 3     insulin syringe-needle U-100 (BD INSULIN SYRINGE) 1 mL 25 x 5/8\" Syrg Use As Directed 4 (four) times a day.       LANCETS MISC Use As Directed 3 (three) times a day.       LANCING DEVICE MISC Use As Directed 3 (three) times a day.       LANTUS 100 unit/mL injection ADMINISTER 45 UNITS UNDER THE SKIN AT BEDTIME 10 mL 11     lisinopril (PRINIVIL,ZESTRIL) 2.5 MG tablet TAKE 1 TABLET BY MOUTH EVERY DAY 90 tablet 3     NEEDLES, INSULIN DISPOSABLE (BD INSULIN PEN NEEDLE UF MINI MISC) Use As Directed. 31 G x 5 MM       NOVOLOG FLEXPEN 100 unit/mL injection pen ADMINISTER 22 UNITS UNDER THE SKIN THREE TIMES DAILY BEFORE A MEAL 15 mL 11     ONETOUCH ULTRA TEST strips TEST FOUR TIMES DAILY AS DIRECTED 350 strip 0     Current Facility-Administered Medications on File Prior to Visit   Medication Dose Route Frequency Provider Last Rate Last Dose     Study Drug Bococizumab 150 mg/Placebo injection 1 Syringe (SPIRE 1020)  1 Syringe Subcutaneous Q14 Days Shaniqua Crowell MD         Study Drug Bococizumab 150 mg/Placebo injection 1 Syringe (SPIRE 1020)  1 Syringe Subcutaneous Q14 Days Suyeon Velo, RN         Study Drug Bococizumab 150 " "mg/Placebo injection 6 Syringe (SPIRE 1020)  6 Syringe Subcutaneous Q14 Days Shaniqua Crowell MD         Study Drug Bococizumab 150 mg/Placebo injection 6 Syringe (SPIRE 1020)  6 Syringe Subcutaneous Q14 Days Suyeon Velo, RN         Study Drug Bococizumab 150 mg/Placebo injection 6 Syringe (SPIRE 1020)  6 Syringe Subcutaneous Q14 Days Suyeon Velo, RN            History   Smoking Status     Former Smoker     Packs/day: 1.00     Types: Cigarettes     Start date: 1/1/1977     Quit date: 1/25/2010   Smokeless Tobacco     Never Used       DIABETIC MEASURES:  Hemoglobin A1c   Date Value Ref Range Status   03/29/2017 7.6 (H) 3.5 - 6.0 % Final   03/23/2016 6.7 (H) 3.5 - 6.0 % Final   01/09/2015 6.6 (H) 3.5 - 6.0 % Final        Lipids:   Lab Results   Component Value Date    HDL 47 (L) 03/29/2017    HDL 57 03/23/2016    HDL 68 01/09/2015    Lab Results   Component Value Date    LDLCALC 79 03/29/2017    LDLCALC 19 03/23/2016    LDLCALC 165 (H) 01/09/2015    Lab Results   Component Value Date    TRIG 95 03/29/2017    TRIG 83 03/23/2016    TRIG 260 (H) 01/09/2015        Microalbumin  Lab Results   Component Value Date    MICROALBUR 2.40 (H) 03/29/2017        Last eye exam: ???      Recent blood pressures:   BP Readings from Last 3 Encounters:   03/29/17 120/66   09/14/16 90/60   09/06/16 126/78        Recent weight:   Wt Readings from Last 3 Encounters:   03/29/17 188 lb 8 oz (85.5 kg)   09/06/16 185 lb (83.9 kg)   04/06/16 197 lb 9.6 oz (89.6 kg)         OBJECTIVE:  :  /66 (Patient Site: Right Arm, Patient Position: Sitting, Cuff Size: Adult Regular)  Pulse 72  Temp 97.6  F (36.4  C) (Oral)   Resp 16  Ht 5' 2.8\" (1.595 m)  Wt 188 lb 8 oz (85.5 kg)  LMP  (LMP Unknown)  BMI 33.6 kg/m2    Gen:  A&A, NAD  HEENT: nasal turbinated moderately edematous and erythematous.  Mild maxillary sinus tenderness bilaterally.  Neck:  supple, no sig LAD or thyromegally  CV:  HRRR, no M/R/G  Resp:  CTAB, although decreased air " movement diffusely  Ext:  W&D, no edema      Lab results:    Results for orders placed or performed in visit on 03/29/17   Glycosylated Hemoglobin A1c   Result Value Ref Range    Hemoglobin A1c 7.6 (H) 3.5 - 6.0 %   Comprehensive Metabolic Panel   Result Value Ref Range    Sodium 141 136 - 145 mmol/L    Potassium 4.5 3.5 - 5.0 mmol/L    Chloride 107 98 - 107 mmol/L    CO2 23 22 - 31 mmol/L    Anion Gap, Calculation 11 5 - 18 mmol/L    Glucose 232 (H) 70 - 125 mg/dL    BUN 22 8 - 22 mg/dL    Creatinine 0.87 0.60 - 1.10 mg/dL    GFR MDRD Af Amer >60 >60 mL/min/1.73m2    GFR MDRD Non Af Amer >60 >60 mL/min/1.73m2    Bilirubin, Total 0.4 0.0 - 1.0 mg/dL    Calcium 9.2 8.5 - 10.5 mg/dL    Protein, Total 6.4 6.0 - 8.0 g/dL    Albumin 3.5 3.5 - 5.0 g/dL    Alkaline Phosphatase 79 45 - 120 U/L    AST 12 0 - 40 U/L    ALT 16 0 - 45 U/L   Lipid Cascade FASTING   Result Value Ref Range    Cholesterol 145 <=199 mg/dL    Triglycerides 95 <=149 mg/dL    HDL Cholesterol 47 (L) >=50 mg/dL    LDL Calculated 79 <=129 mg/dL    Patient Fasting > 8hrs? Yes    Microalbumin, Random Urine   Result Value Ref Range    Microalbumin, Random Urine 2.40 (H) 0.00 - 1.99 mg/dL    Creatinine, Urine 125.6 mg/dL    Microalbumin/Creatinine Ratio Random Urine 19.1 <=19.9 mg/g
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
## 2021-06-11 NOTE — PROGRESS NOTES
64 yo female w/  PAD, tobacco abuse, CAD w/ coronary stents, HTN, DM, neuropathy, and HLP. Hx of Iliac & SFA stenting. Patient had reported one block claudication, increase in thigh pain w/ a cold left foot. was seen in ER. RONNY done 05/26/17 where diminished L leg, WNL R leg, focal stenosis right SFA. ABIs on 09/26/16 were normal w/ no ocal stenosis in the R SFA. Patient reports one block claudication, no rest pain or non-healing ulcers. Patient reports recurrence of cold foot w/ bluish color this week. Embolic event?  She is maintained on SA and statin.

## 2021-06-11 NOTE — PROGRESS NOTES
Bety GARCIA Shawn  [address] [place] [address]  [phone] (home)     Primary cardiologist:  Dr. Bishop  PCP:  Janneth Arteaga MD  Procedure:  Coronary angio with possible intervention  H&P completed by:  Dr. Bishop 7/7/17  Case MD:  Dr. Flores  Admit date and time:  7/7/17  ASAP  Case start time:  ASAP  Ordering MD:  Dr. Bishop  Diagnosis:  Chest pain, CAD  Anticoagulation: None  CPAP: No  Bypass Grafts: No  Renal Issues: No  Allergies:   Allergies   Allergen Reactions     No Known Drug Allergies      Diabetic?: Yes Due to the haste of patient proceeding with procedure she has not held any diabetic agents.   Device?: No  Type:      Angiogram Teaching    Reason for Visit:  Patient seen for pre-procedure education in preparation for: Coronary angio with possible intervention    Procedure Prep:  Cardiologist note dated: 7/7/17   EKG results obtained, dated: 7/7/17 MUSE  Pertinent test results obtained - Viewable in Epic, dated: 12/27/01 Angio (Media), 1/19/2010 Angio (Media), 2/16/2010 Angio (Media), 6/16/2011 Angio (Media)  Hemogram results obtained: On admission  Basic Metabolic Panel results obtained: On admission  Lipid Profile results obtained: 3/29/17    Patient Education  Explained indications/risks for diagnostic evaluation, including one or more of the following:  coronary angiogram, less than 0.1% of acute myocardial infarction and CVA or death or any of the following to the degree that it could threaten life:  allergic reaction, arrhythmia, renal failure, hemorrhage, thrombosis and infection  Explained indications/risks for therapeutic interventions, including one or more of the following: PTCA, stent, 2% or less risk of MI, less than 1% risk of CVA, emergency heart surgery, death, less than 4 % risk of vascular injury requiring surgical repair or blood transfusion, 20-30% risk of restonosis with a bare metal stent, less than 10% risk of restonosis with a drug-eluting stent, 0.5-1% chance of  "stent thrombosis and may need clopidogrel (Plavix) form greater than 1 year.  These risks are in addition to baseline risks associated with a Diagnostic Evaluation.  Patient states understanding of procedure and risks and agrees to proceed.    Pre-procedure instructions  Patient instructed to be NPO after midnight.  Patient instructed to arrange for transportation home following procedure.  Patient instructed to have a responsible adult with them for 24 hours post-procedure.  Post-procedure follow up process.  Conscious sedation discussed.  The patient was  NOT  given the pre-procedure letter (If requested) due to pt request.     Pre-procedure medication instructions  Patient instructed on antiplatelet medication.  Continue medications as scheduled, with a small amount of water on the day of the procedure unless indicated.  Patient instructed to take 325 mg of Aspirin am of procedure: No  Other medication: instructed to hold a.m. of the procedure.  Due to the haste of patient proceeding with procedure she has not held any medications.     *PATIENTS RECORDS AVAILABLE IN Ansible UNLESS OTHERWISE INDICATED*    *Order set was entered on this date: 7/7/17      Patient Active Problem List   Diagnosis     Anemia     Peripheral Neuropathy     Dyslipidemia, goal LDL below 70     Osteoarthritis     Coronary artery disease due to lipid rich plaque     Type 2 diabetes mellitus with circulatory disorder, with long-term current use of insulin     Peripheral Vascular Disease     Severe Recurrent Major Depression     Cardiomyopathy       Current Outpatient Prescriptions   Medication Sig Dispense Refill     acetaminophen-codeine (TYLENOL #3) 300-30 mg per tablet Take 1-2 tablets by mouth every 6 (six) hours as needed for pain. 30 tablet 2     aspirin 81 MG EC tablet Take 81 mg by mouth daily.       atorvastatin (LIPITOR) 80 MG tablet TAKE 1 TABLET BY MOUTH EVERY DAY 90 tablet 3     BD INSULIN PEN NEEDLE UF SHORT 31 gauge x 5/16\" Ndle " "USE TO INJECT INSULIN THREE TIMES DAILY WITH MEALS 100 each 11     blood glucose control, normal Soln Use As Directed 3 (three) times a day. TRUEtest Control Level 1 In Vitro Liquid       BLOOD SUGAR DIAGNOSTIC (ONETOUCH ULTRA TEST MISC) Use 1 strip As Directed 4 (four) times a day.       buPROPion (WELLBUTRIN SR) 150 MG 12 hr tablet TAKE 1 TABLET BY MOUTH TWICE DAILY 180 tablet 3     carvedilol (COREG) 3.125 MG tablet TAKE 1 TABLET BY MOUTH TWICE DAILY 180 tablet 3     clopidogrel (PLAVIX) 75 mg tablet Take 75 mg by mouth daily.       DULoxetine (CYMBALTA) 60 MG capsule TAKE 1 CAPSULE BY MOUTH DAILY 90 capsule 10     furosemide (LASIX) 20 MG tablet TAKE 1 TABLET BY MOUTH EVERY DAY 90 tablet 3     insulin syringe-needle U-100 (BD INSULIN SYRINGE) 1 mL 25 x 5/8\" Syrg Use As Directed 4 (four) times a day.       LANCETS MISC Use As Directed 3 (three) times a day.       LANCING DEVICE MISC Use As Directed 3 (three) times a day.       LANTUS 100 unit/mL injection ADMINISTER 45 UNITS UNDER THE SKIN AT BEDTIME 10 mL 11     lisinopril (PRINIVIL,ZESTRIL) 2.5 MG tablet TAKE 1 TABLET BY MOUTH EVERY DAY 90 tablet 3     NEEDLES, INSULIN DISPOSABLE (BD INSULIN PEN NEEDLE UF MINI MISC) Use As Directed. 31 G x 5 MM       NOVOLOG FLEXPEN 100 unit/mL injection pen ADMINISTER 22 UNITS UNDER THE SKIN THREE TIMES DAILY BEFORE A MEAL 15 mL 11     ONETOUCH ULTRA TEST strips TEST FOUR TIMES DAILY AS DIRECTED 350 strip 0     Current Facility-Administered Medications   Medication Dose Route Frequency Provider Last Rate Last Dose     Study Drug Bococizumab 150 mg/Placebo injection 1 Syringe (SPIRE 1020)  1 Syringe Subcutaneous Q14 Days Shaniqua Crowell MD         Study Drug Bococizumab 150 mg/Placebo injection 1 Syringe (SPIRE 1020)  1 Syringe Subcutaneous Q14 Days Suyeon Velo, RN         Study Drug Bococizumab 150 mg/Placebo injection 6 Syringe (SPIRE 1020)  6 Syringe Subcutaneous Q14 Days Shaniqua Crowell MD         Study Drug Bococizumab " 150 mg/Placebo injection 6 Syringe (SPIRE 1020)  6 Syringe Subcutaneous Q14 Days Suyeon Velo, RN         Study Drug Bococizumab 150 mg/Placebo injection 6 Syringe (SPIRE 1020)  6 Syringe Subcutaneous Q14 Days Suyeon Velo, RN           Allergies   Allergen Reactions     No Known Drug Allergies        Plan  Patient's son-in-law is .  Patient requested education to minimal due to past history.    Patient ready for procedure    HUSAM Latham

## 2021-06-11 NOTE — PROGRESS NOTES
VASCULAR SURGERY CLINIC CONSULTATION    VASCULAR SURGEON: Hilary Nieves MD    LOCATION:  [facility]    Bety Escobar   Medical Record #:  [id number]  YOB: 1950  Age:  66 y.o.     Date of Service: 6/14/2017    PRIMARY CARE PROVIDER: Janneth Arteaga MD    Reason for visit:  PAD with new onset left leg claudication and acute pain episodes    IMPRESSION: Known PAD with patent right iliac and SFA interventions.  Left leg likely has SFA or popliteal occlusive disease.  This is likely the source of her recent development of claudication.  Her 2 episodes of acute calf pain may have been embolic given the history of foot color changes.  That said no real evidence of acute limb ischemia given that she did not have motor or sensory deficits at the time or now.  Source for embolic event is either thromboembolic from her arterial circulation or cardioembolic    RECOMMENDATION: CTA with runoff to assess for thromboembolic source, most importantly the left SFA and popliteal.  Also should undergo cardiology consultation and echocardiogram to assess risk of cardioembolic source.  Start her on Plavix at this point.  Also needs surveillance of her right iliac and SFA stents.    HPI:  Bety Escobar is a 66 y.o. female who was seen today in consultation for her PAD which appears to have progressed over the last several weeks.  She has a remote history of right iliac and SFA stents.  These were presumably done for claudication although she does not very clearly recall if her symptoms were similar to her current symptoms.  She certainly did not have wounds or rest pain.  On May 26 she went to the ER with acute terrible calf pain which workup did not reveal any acute crisis.  She was sent home with pain meds.  Another similar episode 2 nights ago which she managed at home with pain medicine.  She is a former smoker who quit in 2010.  She is on aspirin and a statin.  Currently her claudication pain  "comes in her calf especially when walking up a hill.  Is classic in that it resolves with a small amount of rest.  She does get short of breath and this also limits her walking.  As a result she is willing to accept conservative management of her claudication.  She is much more concerned about the acute episodes of bad calf pain at rest.  As am I.    PHH:    Past Medical History:   Diagnosis Date     Claudication      DM (diabetes mellitus screen)      HTN (hypertension)      Hyperlipidemia      PAD (peripheral artery disease)      S/P arterial stent         Past Surgical History:   Procedure Laterality Date     ANGIOPLASTY / STENTING FEMORAL Right 06/03/2008     ANGIOPLASTY / STENTING FEMORAL Left 06/21/2007     CORONARY ANGIOPLASTY WITH STENT PLACEMENT  06/16/2011     CORONARY ANGIOPLASTY WITH STENT PLACEMENT  02/16/2010     CORONARY ANGIOPLASTY WITH STENT PLACEMENT  1999     ILIAC ARTERY STENT Left 03/25/2009     [place] KNEE SCOPE,DIAGNOSTIC      Description: Arthroscopy Knee;  Proc Date: 02/13/2004;  Comments: Arthroscopy of the left knee with partial media mesisectomy due to meniscus tear.  Dr. Alex Robles.     TONSILLECTOMY AND ADENOIDECTOMY       TUBAL LIGATION         ALLERGIES:  No known drug allergies    MEDS:    Current Outpatient Prescriptions:      acetaminophen-codeine (TYLENOL #3) 300-30 mg per tablet, Take 1-2 tablets by mouth every 6 (six) hours as needed for pain., Disp: 30 tablet, Rfl: 2     aspirin 81 MG EC tablet, Take 81 mg by mouth daily., Disp: , Rfl:      atorvastatin (LIPITOR) 80 MG tablet, TAKE 1 TABLET BY MOUTH EVERY DAY, Disp: 90 tablet, Rfl: 3     BD INSULIN PEN NEEDLE UF SHORT 31 gauge x 5/16\" Ndle, USE TO INJECT INSULIN THREE TIMES DAILY WITH MEALS, Disp: 100 each, Rfl: 11     blood glucose control, normal Soln, Use As Directed 3 (three) times a day. TRUEtest Control Level 1 In Vitro Liquid, Disp: , Rfl:      BLOOD SUGAR DIAGNOSTIC (ONETOUCH ULTRA TEST MISC), Use 1 strip As Directed 4 " "(four) times a day., Disp: , Rfl:      buPROPion (WELLBUTRIN SR) 150 MG 12 hr tablet, TAKE 1 TABLET BY MOUTH TWICE DAILY, Disp: 180 tablet, Rfl: 3     carvedilol (COREG) 3.125 MG tablet, TAKE 1 TABLET BY MOUTH TWICE DAILY, Disp: 180 tablet, Rfl: 3     clopidogrel (PLAVIX) 75 mg tablet, Take 75 mg by mouth daily., Disp: , Rfl:      DULoxetine (CYMBALTA) 60 MG capsule, TAKE 1 CAPSULE BY MOUTH DAILY, Disp: 90 capsule, Rfl: 10     furosemide (LASIX) 20 MG tablet, TAKE 1 TABLET BY MOUTH EVERY DAY, Disp: 90 tablet, Rfl: 3     insulin syringe-needle U-100 (BD INSULIN SYRINGE) 1 mL 25 x 5/8\" Syrg, Use As Directed 4 (four) times a day., Disp: , Rfl:      LANCETS MISC, Use As Directed 3 (three) times a day., Disp: , Rfl:      LANCING DEVICE MISC, Use As Directed 3 (three) times a day., Disp: , Rfl:      LANTUS 100 unit/mL injection, ADMINISTER 45 UNITS UNDER THE SKIN AT BEDTIME, Disp: 10 mL, Rfl: 11     lisinopril (PRINIVIL,ZESTRIL) 2.5 MG tablet, TAKE 1 TABLET BY MOUTH EVERY DAY, Disp: 90 tablet, Rfl: 3     NEEDLES, INSULIN DISPOSABLE (BD INSULIN PEN NEEDLE UF MINI MISC), Use As Directed. 31 G x 5 MM, Disp: , Rfl:      NOVOLOG FLEXPEN 100 unit/mL injection pen, ADMINISTER 22 UNITS UNDER THE SKIN THREE TIMES DAILY BEFORE A MEAL, Disp: 15 mL, Rfl: 11     ONETOUCH ULTRA TEST strips, TEST FOUR TIMES DAILY AS DIRECTED, Disp: 350 strip, Rfl: 0    Current Facility-Administered Medications:      Study Drug Bococizumab 150 mg/Placebo injection 1 Syringe (SPIRE 1020), 1 Syringe, Subcutaneous, Q14 Days, Shaniqua Crowell MD     Study Drug Bococizumab 150 mg/Placebo injection 1 Syringe (SPIRE 1020), 1 Syringe, Subcutaneous, Q14 Days, Suyeon Velo, RN     Study Drug Bococizumab 150 mg/Placebo injection 6 Syringe (SPIRE 1020), 6 Syringe, Subcutaneous, Q14 Days, Shaniqua Crowell MD     Study Drug Bococizumab 150 mg/Placebo injection 6 Syringe (SPIRE 1020), 6 Syringe, Subcutaneous, Q14 Days, Suyeon Velo, RN     Study Drug Bococizumab 150 " "mg/Placebo injection 6 Syringe (SPIRE 1020), 6 Syringe, Subcutaneous, Q14 Days, Suyeon Velo, RN    SOCIAL HABITS:    History   Smoking Status     Former Smoker     Packs/day: 1.00     Types: Cigarettes     Start date: 1/1/1977     Quit date: 1/25/2010   Smokeless Tobacco     Never Used       History   Alcohol Use No       History   Drug Use No       FAMILY HISTORY:    Family History   Problem Relation Age of Onset     Alzheimer's disease Mother      Amputation of Leg Below Knee Mother      No Medical Problems Father      Breast cancer Neg Hx        PE:  /80  Pulse 78  Resp 16  Ht 5' 3\" (1.6 m)  Wt 180 lb (81.6 kg)  LMP  (LMP Unknown)  Breastfeeding? No  BMI 31.89 kg/m2    Alert and appropriate  Pulses: 2+ femoral bilaterally  Rt pop :1+ Lt pop:0  Rt DP:2+ Lt DP: 0  Rt PT: 1+ Lt PT: 0    No foot wounds.  No foot discoloration.      DIAGNOSTIC STUDIES: RONNY: Right 1.14, left 0.76    LABS:              Invalid input(s): MARISABEL Nieves MD  VASCULAR SURGERY     I spent greater than 50% of this 45 minute visit in counseling on her symptoms, potential etiologies and need for workup      "

## 2021-06-11 NOTE — PROGRESS NOTES
Follow-up, PAD w/ left leg claudication and recent onset of episodic foot pain w/ discoloration. Pt underwnt CTA and echo 06/29/17 to r/o embolic source. Echo shows global hypokinesis, EF 40%,  CTA shows significant bilat arterial disease. No embolic source. Pt was seen by Dr Bishop, hospitalized due to unstable angina and CHF. Underwent PTCA w/ stenting. Patient reports severe right hip and thigh pain, she reports she has not had a recurrence of her peripheral symptoms, today's pain is characteristically different. Possibly osteoarthritic or nerve related.

## 2021-06-12 NOTE — PROGRESS NOTES
VASCULAR SURGERY CLINIC CONSULTATION    VASCULAR SURGEON: Hilary Nieves MD    LOCATION:  [facility]    Bety Escobar   Medical Record #:  [id number]  YOB: 1950  Age:  66 y.o.     Date of Service: 8/23/2017    PRIMARY CARE PROVIDER: Janneth Arteaga MD      Reason for visit: Follow-up after emergency right leg surgery.    IMPRESSION: Successful revascularization, with only some numbness could be ischemic neuropathy versus residual edema.  Important right groin wound breakdown.  Without signs of infection however.    RECOMMENDATION: Local wound care as described above with daily saline cleaning and Santyl.  Follow-up in 1 month to reassess healing.  Weekly follow-up with Gypsy Odell for wound care.    HPI:  Bety Escobar is a 66 y.o. female who was seen today in consultation for recent right femoral endarterectomy performed emergently by Dr. STACI Archer.  She is now 3 weeks postop and doing very well overall.  In summary I had seen her and recommended angiogram as well as cardiac workup.  In a very short period of time she presented emergently with acute coronary syndrome and required emergency heart bypass surgery.  In the postoperative period she developed right leg acute limb ischemia and an emergency angiogram showed common femoral occlusion.  She was taken urgently to the operating room and underwent right groin exploration and femoral endarterectomy with reconstitution of her arterial supply.  She did not undergo fasciotomies and appears to have had no consequences from this with essentially normal motor function in the limb and only a feeling of mild numbness in the foot which may be attributed to her edema.  Since discharge she has been at a rehab institution, and has started cardiovascular rehab with treadmill walking.  They have noted hyperglycemia but appear to have this under reasonable control and gradually improving.  She has not been spiking fevers or having chills.   He is completely alert and cooperative.    The major issue is at her right groin wound appears to be broken down somewhat.  Portion as a dime size area of fibrin and skin sloughing.  The staples are still in place and she does not have any concerns of significant drainage.  There is no surrounding cellulitis.  Her medial vein harvest access site just below the knee also has some superficial skin necrosis.  PHH:    Past Medical History:   Diagnosis Date     Acute non-ST elevation myocardial infarction (NSTEMI) 7/8/2017     Acute non-ST elevation myocardial infarction (NSTEMI) 7/8/2017     Acute non-ST elevation myocardial infarction (NSTEMI) 7/8/2017     Cardiomyopathy 01/18/2010    EF 18% per Dr. Kebede with apparent improvement after PCI and medications     Coronary artery disease due to lipid rich plaque 07/07/2000    100% RCA with left to right collaterals since 1999, has had stents to LAD (twice), LCX (twice) and ramus, all by Dr. Kebede at [facility]     Diabetes mellitus type 2 in obese 1998    started on insulin in 2003     Dyslipidemia, goal LDL below 70 1999     Family history of myocardial infarction     mother, father, brother CABG     Peripheral Vascular Disease 6/21/2007    Aguilar Pope: s/p lower extremity stenting         Past Surgical History:   Procedure Laterality Date     ANGIOPLASTY / STENTING FEMORAL Right 06/03/2008     ANGIOPLASTY / STENTING FEMORAL Left 06/21/2007     CARDIAC CATHETERIZATION N/A 7/7/2017    Procedure: Coronary Angiogram;  Surgeon: Manjeet Sepulveda MD;  Location: [facility];  Service:      CARDIAC CATHETERIZATION N/A 7/28/2017    Procedure: Coronary Angiogram;  Surgeon: Genoveva Flores MD;  Location: [facility];  Service:      CORONARY ARTERY BYPASS GRAFT N/A 8/4/2017    Procedure: CORONARY ARTERY BYPASS X 4 RIGHT LEG ENDOSCOPIC VEIN HARVEST RIGHT INTERNAL MAMMARY ARTERY ANESTHESIA,TRANSESOPHAGEAL,ECHOCARGIOGRAM, ACCESS OF THE RIGHT CFA WITH 5  "FRENCH SHEATH;  Surgeon: Vicente Wagoner MD;  Location: [facility];  Service:      CORONARY STENT PLACEMENT  1999    LAD and LCX     CORONARY STENT PLACEMENT  01/18/2010    distal LCX [facility] Dr[facility]ede [facility]     CORONARY STENT PLACEMENT  02/16/2010    mid LAD by Dr. Kebede at Health system     CORONARY STENT PLACEMENT  06/14/2011    ramus by Dr. Kebede at Health system; chronic 100% RCA known since 1999     CORONARY STENT PLACEMENT  07/07/2017    LUCA 3.5 x 20 to prox mid LAD and LUCA to DG by Dr. Sepulveda     FEMORAL ARTERY REPAIR Right 8/7/2017    Procedure: EXPLORE FEMORAL ARTERY;  Surgeon: Roni Archer MD;  Location: [facility];  Service:      ILIAC ARTERY STENT Left 03/25/2009     IL KNEE SCOPE,DIAGNOSTIC      Description: Arthroscopy Knee;  Proc Date: 02/13/2004;  Comments: Arthroscopy of the left knee with partial media mesisectomy due to meniscus tear.  Dr. Alex Robles.     TONSILLECTOMY AND ADENOIDECTOMY       TUBAL LIGATION         ALLERGIES:  Other environmental allergy    MEDS:    Current Outpatient Prescriptions:      acetaminophen (TYLENOL) 325 MG tablet, Take 2 tablets (650 mg total) by mouth every 4 (four) hours as needed., Disp: , Rfl: 0     aspirin 81 MG EC tablet, Take 81 mg by mouth daily. , Disp: , Rfl:      atorvastatin (LIPITOR) 80 MG tablet, Take 80 mg by mouth at bedtime. , Disp: , Rfl:      BD INSULIN PEN NEEDLE UF SHORT 31 gauge x 5/16\" Ndle, USE TO INJECT INSULIN THREE TIMES DAILY WITH MEALS, Disp: 100 each, Rfl: 11     blood glucose control, normal Soln, Use As Directed 3 (three) times a day. TRUEtest Control Level 1 In Vitro Liquid, Disp: , Rfl:      blood glucose test strips, Use 1 each As Directed 4 (four) times a day before meals and at bedtime. Dispense brand per patient's insurance at pharmacy discretion., Disp: 200 strip, Rfl: 11     blood-glucose meter Misc, Use glucometer to test blood sugar 4 times per day., Disp: 1 each, Rfl: 0     buPROPion " "(WELLBUTRIN SR) 150 MG 12 hr tablet, Take 150 mg by mouth 2 (two) times a day. , Disp: , Rfl:      carvedilol (COREG) 6.25 MG tablet, Take 1 tablet (6.25 mg total) by mouth 2 (two) times a day., Disp: 60 tablet, Rfl: 0     clopidogrel (PLAVIX) 75 mg tablet, Take 75 mg by mouth daily. , Disp: , Rfl:      DULoxetine (CYMBALTA) 60 MG capsule, Take 60 mg by mouth daily. , Disp: , Rfl:      furosemide (LASIX) 40 MG tablet, Take one 40 mg tablet by mouth morning and evening for the next two days Continue with on 40 mg tablet by mouth every morning thereafter, Disp: 60 tablet, Rfl: 0     insulin aspart (NOVOLOG FLEXPEN) 100 unit/mL injection pen, Inject 2 Units under the skin 3 (three) times a day before meals., Disp: 15 mL, Rfl: 0     insulin syringe-needle U-100 (BD INSULIN SYRINGE) 1 mL 25 x 5/8\" Syrg, Use As Directed 4 (four) times a day., Disp: , Rfl:      LANCETS MISC, Use As Directed 3 (three) times a day., Disp: , Rfl:      LANCING DEVICE MISC, Use As Directed 3 (three) times a day., Disp: , Rfl:      LANTUS 100 unit/mL injection, Inject 18 Units under the skin at bedtime., Disp: 10 mL, Rfl: PRN     lisinopril (PRINIVIL,ZESTRIL) 5 MG tablet, Take 1 tablet (5 mg total) by mouth daily., Disp: 30 tablet, Rfl: 0     NEEDLES, INSULIN DISPOSABLE (BD INSULIN PEN NEEDLE UF MINI MISC), Use As Directed. 31 G x 5 MM, Disp: , Rfl:      OLANZapine zydis (ZYPREXA) 5 MG disintegrating tablet, Take 1 tablet (5 mg total) by mouth daily., Disp: 30 tablet, Rfl: 0     oxyCODONE (ROXICODONE) 5 MG immediate release tablet, Take 1-2 tablets (5-10 mg total) by mouth every 6 (six) hours as needed for pain (5 mg for mild to moderate paint (pain score 3-4) or 10 mg for moderate pain (pain score 5-6))., Disp: 30 tablet, Rfl: 0    Current Facility-Administered Medications:      lidocaine 2 % jelly (XYLOCAINE), , Topical, PRN, Hilary Nieves MD    SOCIAL HABITS:    History   Smoking Status     Former Smoker     Packs/day: 1.00     Years: 33.00 " "    Types: Cigarettes     Start date: 1/1/1977     Quit date: 1/25/2010   Smokeless Tobacco     Never Used       History   Alcohol Use No       History   Drug Use No       FAMILY HISTORY:    Family History   Problem Relation Age of Onset     Alzheimer's disease Mother      Amputation of Leg Below Knee Mother      CABG Mother      Prostate cancer Father      CABG Father      No Medical Problems Daughter      No Medical Problems Son      No Medical Problems Daughter      CABG Brother      Breast cancer Neg Hx        REVIEW OF SYSTEMS:    A 12 point ROS was reviewed and except for what is listed in the HPI above, all others are negative    PE:  /58  Pulse 68  Resp 16  Ht 5' 3\" (1.6 m)  Wt 183 lb (83 kg)  LMP  (LMP Unknown)  Breastfeeding? No  BMI 32.42 kg/m2  Wt Readings from Last 1 Encounters:   08/23/17 183 lb (83 kg)     Body mass index is 32.42 kg/(m^2).    EXAM:  GENERAL: This is a well-developed 66 y.o. female who appears her stated age  EYES: Grossly normal.  MOUTH: Buccal mucosa normal   CARDIAC:  NS1 S2, No Murmur  CHEST/LUNG:  Clear lung fields bilaterally   GASTROINTESINAL (ABDOMEN): Soft, non-tender, B/S present, no pulsatile mass  MUSCULOSKELETAL: Grossly normal and both lower extremities are intact.  HEME/LYMPH: No lymphedema  NEUROLOGIC: Focally intact, Alert and oriented x 3.   PSYCH: appropriate affect  INTEGUMENT: Right groin longitudinal incision with staples still in place.  Poor evidence of healing.  No cellulitis.  Central focal area of skin and soft tissue necrosis 1 cm x 1 cm.  Right below-knee vein harvest access site with skin sloughing.  Left groin oblique incision healing nicely.  Pulse Exam:     DP: Left 0   Right  0     PT:   Left 0   Right  0          DIAGNOSTIC STUDIES:     Images:  Xr Chest Ap Portable    Result Date: 8/7/2017  XR CHEST AP PORTABLE 8/7/2017 5:59 AM INDICATION: confirm ET Tube position COMPARISON: 8/5/2017 FINDINGS: ET tube, NG tube and pleural drains are in " good position. [place]-Alan catheter is been removed with right IJ port remaining in place. Mild cardiomegaly, pulmonary vessels normal. Lungs clear, no pneumothorax.    Xr Chest Ap Portable    Result Date: 8/5/2017  XR CHEST AP PORTABLE 8/5/2017 1:28 AM INDICATION: Status post cardiac surgery. COMPARISON: 8/4/2017. FINDINGS: [place]-Alan catheter tip in the right pulmonary artery. This has been pulled back slightly. Sternotomy with improved perihilar congestion. Right-sided chest tube. No pneumothorax. Stable endotracheal tube.    Xr Chest Ap Portable    Result Date: 8/4/2017  XR CHEST AP PORTABLE 8/4/2017 6:20 PM INDICATION: s/p cardiac surgery COMPARISON: 7/24/2017 FINDINGS: Postoperative changes with sternal wires present. Heart size and pulmonary vessels normal. Mild bibasilar atelectasis, lungs otherwise clear. No pneumothorax. Tubes and catheters are in good position.    Xr Chest Pa And Lateral    Result Date: 8/12/2017  XR CHEST PA AND LATERAL 8/12/2017 8:16 AM INDICATION: s/p CABG and CT removal COMPARISON: 8/10/2017 at 1434 hours FINDINGS: Bilateral chest tubes have been removed. There are small bilateral pleural effusions. No pneumothorax. No airspace opacities. No significant edema. The cardiomediastinal silhouette is enlarged, which has not significantly changed. Prior median sternotomy noted. There is aortic calcification.    Xr Chest Pa And Lateral    Result Date: 8/10/2017  XR CHEST PA AND LATERAL 8/10/2017 2:46 PM INDICATION: Postop evaluation COMPARISON: 8/7/2017 FINDINGS: There are bilateral large bore chest tubes, with the left of the left lung apex unchanged. The right one has been pulled back slightly so that the tip overlies the right lung base. There is no evidence of pneumothorax or significant pleural effusion. Bandlike opacities in both lungs likely represent atelectasis. The pulmonary vasculature appears normal. There is mild stable cardiomegaly with median sternotomy wires.    Xr Chest Pa And  Lateral    Result Date: 7/24/2017  XR CHEST PA AND LATERAL 7/24/2017 3:02 PM INDICATION: Shortness of breath, chest pain COMPARISON: 6/14/2011 FINDINGS: There is poor inspiration with crowding the pulmonary vascular markings and mild opacities in the lung bases may represent atelectasis versus consolidation. The pulmonary vasculature, cardiac silhouette, and pleural spaces appear normal for the  degree of inspiration.    Xr Abdomen Ap Portable    Result Date: 8/7/2017  XR ABDOMEN AP PORTABLE 8/7/2017 5:58 AM INDICATION: check og tube placement COMPARISON: None. FINDINGS: NG tube present with its tip in the proximal stomach, side port remains within the distal esophagus. Bowel gas pattern normal. Subxiphoid chest tubes present.    Ct Head Without Contrast    Result Date: 8/5/2017  [facility] CT HEAD WO CONTRAST 8/5/2017 2:44 PM INDICATION: Stroke TECHNIQUE: Routine. Dose reduction techniques were used. CONTRAST: None. COMPARISON:  05/04/2010 FINDINGS: Motion degraded exam. No definite intracranial hemorrhage, extraaxial collection, mass effect or CT evidence of acute infarct.      The ventricles and sulci are normal for age. Osseous structures are intact.         CONCLUSION: 1.  Motion degraded exam. No definite acute finding.     Mr Myocardium Without Contrast    Result Date: 8/1/2017  No images obtained given patient was unable to tolerated study.  Will be repeated with sedation.      Ir Extremity Angiogram Right    Addendum Date: 8/14/2017    FINDINGS:  Aortogram demonstrates patent Aorta.      Result Date: 8/14/2017  [facility] 8/7/2017 PROCEDURE: ABDOMINAL AORTOGRAM AND PELVIC ANGIOGRAM INTERVENTIONAL RADIOLOGIST: Villa Pastor MD. INDICATION: Suspected occlusion of the right common femoral artery from indwelling right common femoral arterial line with acutely ischemic right leg. SEDATION: Versed 0.5 mg. Fentanyl 50 mcg. The procedure was performed with administration intravenous conscious  sedation with appropriate preoperative, intraoperative, and postoperative evaluation. 30 minutes of supervised face to face conscious sedation  time was provided by a radiology nurse under my direct supervision. ADDITIONAL MEDICATIONS: None. FLUOROSCOPIC TIME: 4.4 minutes AIR KERMA (Ka,r): 554 mGy. CONTRAST: 35 cc of Visipaque 320 COMPLICATIONS: No immediate complications. PROCEDURE: The bilateral groins were prepped and draped in the usual sterile fashion. Attempted access of the left common femoral artery and the left groin was not successful due to significant atherosclerotic disease. To avoid any further compromised to the left common femoral artery access of the right common femoral artery and the right groin was obtained using ultrasound guidance. Access was obtained in the more proximal segment of the right common femoral artery an attempt to avoid the visible thrombus within the distal right common femoral artery. Eventually a 5 [place] Verdean sheath was placed into the right common femoral artery. Through the sheath and over a Glidewire and omniflush catheter was advanced into the aorta. An aortic angiogram was obtained. The  Omni Flush catheter was then pulled down to the distal aorta and pelvic angiograms in the AP, KHANNA and ADAL oblique projections were obtained. FINDINGS: Aortogram and pelvic angiogram demonstrates acutely thrombosed right common femoral artery. The right common iliac and external iliac arteries are patent. There is reconstitution of flow into the right SFA and dorsalis pedis artery. There is a focal high-grade stenosis with associated calcified atherosclerotic plaque of the proximal left common iliac artery. The left external iliac artery is relatively patent. There is significant calcified atherosclerotic disease of the left common femoral artery with multifocal stenoses.     1.  Aortogram and pelvic inflow angiogram demonstrates an acutely thrombosed right common femoral artery. The right  common iliac and external iliac arteries are patent. There is reconstitution of flow in the right SFA and profunda femoral artery. 2.  Focal high-grade stenosis of the proximal left common iliac artery with associated eccentric calcified atherosclerotic plaque. The left external iliac artery is patent. There is severe atherosclerotic disease of the left common femoral artery with multifocal stenoses. CPT codes for physician use only: 61360    Ir Extremity Angiogram Right    Result Date: 8/4/2017  [facility] 8/4/2017 PROCEDURE/STUDY: RIGHT COMMON FEMORAL ARTERY ACCESS AND PLACEMENT OF A 5 Vietnamese SHEATH UNDER ULTRASOUND AND FLUOROSCOPIC GUIDANCE INTERVENTIONAL RADIOLOGIST: Villa Pastor MD. INDICATION: Surgery unable to access and threaded a wire through the bilateral common femoral artery. SEDATION: Patient was in the OR under general anesthesia. FLUOROSCOPIC TIME: 0.1 minutes NUMBER OF IMAGES: 1 CONTRAST: None. COMPLICATIONS: No immediate complications. PROCEDURE/FINDINGS: The right groin was prepped and draped in the usual sterile fashion. Using ultrasound guidance and a micropuncture needle access of the right common femoral artery was obtained. Through the micropuncture needle an 018 wire was advanced into the right common femoral artery. Over the 018 wire the needle was exchanged for a 4 Kinyarwanda Tez sheath. The inner dilator of the sheath was then removed and through the Tez sheath a Bentson wire was advanced into the right common femoral artery. Over the Bentson wire a 5 Kinyarwanda sheath was placed into the right common femoral artery. The sheath was then secured to the right groin using 2-0 silk suture. A saved fluoroscopic image of the wire in the right external iliac artery was saved.     1.  Successful ultrasound and fluoroscopic guided access of the right common femoral artery and placement of a 5 Kinyarwanda sheath into the right common femoral artery in the OR.     Cta Chest Pe Run    Result  Date: 7/24/2017  [facility]A CHEST PE RUN 7/24/2017 4:11 PM INDICATION: Chest pain, acute, PE suspected TECHNIQUE: Helical acquisition through the chest was performed during the arterial phase of contrast enhancement using IV contrast. 2D and 3D reconstructions were performed by the CT technologist. Dose reduction techniques were used. IV CONTRAST: 90 ml omni 350 COMPARISON: None. FINDINGS: ANGIOGRAM CHEST: Negative for pulmonary emboli. Negative for thoracic aortic dissection and aneurysms. Densely calcified atherosclerotic plaque in the coronary arteries. LUNGS AND PLEURA: Moderate centrilobular edema. Small bilateral pleural effusions. Evaluation of the lungs is compromised by respiratory motion. There is a 3 mm nodule in the left upper lobe on series 6, image 30. There is a 3 mm subpleural nodule in the  left lower lobe on series 6, image 71. Dependent opacities in the lungs likely represent atelectasis. MEDIASTINUM: The thyroid gland appears diffusely enlarged. No lymphadenopathy. LIMITED UPPER ABDOMEN: Densely calcified atherosclerotic plaque in the proximal abdominal aorta and the origin of its visualized branches. MUSCULOSKELETAL: Negative.     CONCLUSION: 1.  No evidence pulmonary embolus. 2.  Small bilateral pleural effusions. 3.  Small subpleural pulmonary nodules. Follow-up is recommended as per the Fleischner guidelines. 4.  Moderate to severe centrilobular emphysema. 5.  Densely calcified atherosclerotic plaque visualized arteries. 6.  The thyroid gland appears diffusely enlarged suggesting goiter. REFERENCE: Guidelines for Management of Incidental Pulmonary Nodules Detected on CT Images: From the Fleischner Society 2017. Guidelines apply to incidental nodules in patients who are 35 years or older. Guidelines do not apply to lung cancer screening, patients with immunosuppression, or patients with known primary cancer. MULTIPLE NODULES Nodule size less than or equal to 6 mm Low-risk patients: No follow-up  needed. High-risk patients: Optional follow-up at 12 months. Nodule size 6 mm or larger Low-risk patients: Follow-up CT at 3-6 months, then consider CT at 18-24 months. High-risk patients: Follow-up CT at 3-6 months, then at 18-24 months if no change. -Use most suspicious nodule as guide to management. NOTE: ABNORMAL REPORT THE DICTATION ABOVE DESCRIBES AN ABNORMALITY FOR WHICH FOLLOW-UP IS NEEDED.     Us Carotid Bilateral    Result Date: 8/1/2017  US CAROTID BILATERAL 8/1/2017 3:45 PM INDICATION: pre CAB TECHNIQUE: Duplex exam performed utilizing 2D gray-scale imaging, Doppler interrogation with color-flow and spectral waveform analysis. COMPARISON: None. FINDINGS: RIGHT: There is moderate atheromatous plaque. Normal waveforms with no significant stenosis. LEFT: There is moderate atheromatous plaque. Elevated internal carotid velocity. The left vertebral artery has a prestenotic waveform. The left subclavian artery is monophasic suggesting a more proximal subclavian stenosis. The right vertebral and subclavian waveforms appear normal. VELOCITY CHART: The following velocities were obtained in the RIGHT carotid system. CCA: 77/17 cm/s ICA: 106/19 cm/s ECA: 138/26 cm/s ICA/CCA: PS 1.4 The following velocities were obtained in the LEFT carotid system. CCA: 93/17 cm/s ICA: 172/34 cm/s ECA: 124/21 cm/s ICA/CCA: PS 1.8                            CONCLUSION: 1.  Bilateral carotid atherosclerosis. 2.  Left internal carotid 50-69% diameter stenosis. 3.  Left subclavian artery stenosis with left vertebral prestenotic waveform. Evaluation based on velocities and NASCET criteria.    Us Ankle Brachial Indices    Result Date: 7/26/2017  RESTING ANKLE-BRACHIAL INDICES 7/25/2017 4:58 PM INDICATIONS: Peripheral arterial disease. Leg pain. COMPARISONS: 5/26/2017. FINDINGS: RONNY's: Rt PTA:  0.54      DPA:  0.50 Lt PTA:  0.75     DPA:  0.72 Previous ABIs were 1.14 on the right and 0.76 on the left. There are markedly dampened monophasic  waveforms in the distal right posterior tibial and dorsalis pedis arteries. Waveforms are also monophasic in the distal left posterior tibial and dorsalis pedis arteries. CONCLUSIONS: 1.  Abnormal resting ABIs, right worse than left. 2.  Significant deterioration in the right ABIs compared with previous study dated 5/26/2017.    Us Arterial Legs Bilateral Complete    Result Date: 8/5/2017  US ARTERIAL LEGS BILATERAL COMPLETE 8/5/2017 1:23 AM INDICATION: Peripheral vascular disease, status post CABG with right femoral sheath, mottling of legs. TECHNIQUE: Duplex imaging was performed utilizing gray-scale, compression, augmentation as appropriate, color-flow, Doppler waveform analysis, and spectral Doppler imaging. COMPARISON: CTA 6/29/2017, ultrasound 5/26/2017. FINDINGS: LOWER EXTREMITY ARTERIAL DUPLEX EXAM RIGHT (cm/s) EIA: 83 CFA: 22 PFA: 15 (limited visualization) SFA-Proximal: 14 SFA-Mid: 13 SFA-Distal: 14 Popliteal: 13 PTA: 6 TONIE: 8 DPA: 5 (M=monophasic, B=biphasic, T=triphasic) LEFT (cm/s) EIA: 165 CFA: 108 PFA: 65 SFA-Proximal: 64 SFA-Mid: 35 SFA-Distal: 21 Popliteal: 24 PTA: 10 TONIE: 32 DPA: 6 (M=monophasic, B=biphasic, T=triphasic)     CONCLUSION: 1.  Evidence for hemodynamically significant inflow stenosis or occlusion proximal to right common femoral artery new since prior. Extensive atherosclerotic plaque. 2.  Improved upstroke in waveforms of left lower extremity to the level of the trifurcation vessels along with increased diastolic flow and turbulence suggestive of interval development of hyperemia. Suboptimal visualization of posterior tibial and dorsalis pedis arteries at the ankle with presumed hemodynamically significant trifurcation disease.    Us Vein Mapping For Pre Bypass Graft Bilateral    Result Date: 8/3/2017  [facility] EXAM: ULTRASOUND VENOUS MAPPING STUDY OF THE LOWER EXTREMITIES 8/3/2017 INDICATION: Preop CABG. TECHNIQUE: Ultrasound examination of the lower extremity veins was  performed, including gray-scale, color, and Doppler waveform analysis. GREATER SAPHENOUS VEIN (mm) RIGHT Upper Thigh: 5 Mid Thigh: 2 Lower Thigh: 2 Knee: 2 Upper Calf: 2 Mid Calf: 2 Lower Calf: 1 Ankle: 2  LEFT Upper Thigh: 4 Mid Thigh: 2 Lower Thigh: 2 Knee: 2 Upper Calf: 1 Mid Calf: 1 Lower Calf: 2 Ankle: 2 SMALL SAPHENOUS VEIN (mm) RIGHT Upper Calf: 1 Mid Calf: 2 Lower Calf: 1 Ankle: 2 LEFT Upper Calf: 2 Mid Calf: 2 Lower Calf: 2 Ankle: 2 RIGHT LOWER EXTREMITY VENOUS: FINDINGS: No evidence of DVT in the right common femoral vein, femoral vein, profunda femoral vein, popliteal vein, lower calf veins. Right great saphenous and small saphenous vein are patent with measurements as described above. LEFT LOWER EXTREMITY VENOUS: FINDINGS: No evidence of deep venous thrombosis within the left common femoral vein, femoral vein, profunda femoral vein, popliteal vein or calf veins. The left great saphenous and small saphenous veins are patent with measurements as described above.     1. Bilateral great saphenous veins and small saphenous veins are patent with measurements as described above. 2. No evidence of DVT in either leg.    Us Venous Legs Bilateral    Result Date: 7/24/2017  US VENOUS LEGS BILATERAL 7/24/2017 10:08 PM INDICATION: for swelling in legs TECHNIQUE: Routine exam with compression, augmentation, and duplex utilizing 2D gray-scale imaging, Doppler interrogation with color-flow and spectral waveform analysis. COMPARISON: None. FINDINGS: The common femoral, femoral, popliteal, and segmentally visualized calf veins were evaluated. Right leg veins are negative for deep venous thrombosis. Left leg veins are negative for deep venous thrombosis. No popliteal cysts.     CONCLUSION: 1.  Bilateral leg veins are negative for DVT.    Us Venous Leg Right    Result Date: 8/11/2017  US VENOUS LEG RIGHT 8/11/2017 12:33 PM INDICATION: Swollen right leg TECHNIQUE: Routine exam without and with compression, augmentation, and  duplex utilizing 2D gray-scale imaging, Doppler interrogation with color-flow and spectral waveform analysis. COMPARISON: 2017. FINDINGS: The common femoral, femoral, popliteal, and segmentally visualized calf veins were evaluated. The opposite CFV was also included in the evaluation. Right leg veins are negative for deep venous thrombosis. No popliteal cysts. There are postoperative changes from right greater saphenous vein harvesting with a hematoma along the course of the greater saphenous vein. There is a small amount of superficial thrombus at the ankle.     CONCLUSION: 1.  Right leg veins are negative for DVT. Postoperative change from saphenous vein harvesting. Small amount of superficial thrombus at the distal ankle.    Mr Myocardium With Without Contrast    Result Date: 2017  [facility] Cardiac MRI Cardiac MRI with and without Contrast Patient: Bety Escobar MRN: [id number] : 1950 Primary Care Provider: Janneth Arteaga MD Ordering Physician: Tez Robles MD Date of Service: 2017 Location: [facility] Indication: Ischemic cardiomyopathy TECHNIQUE:  ECG gated MRI of the heart without and with IV contrast (10 mL of Gadavist IV gadolinium) performed on a Siemens 3.0 Marlys MRI scanner.  Axial HASTE and steady state free precession images, multiplanar steady-state free procession cine images, multiplanar gradient echo cine images, T1 and T2 Turbo spin-echo images with and without Fat saturation suppression, Grid tagging images, rest or first pass contrast perfusion images and delayed contrast-enhanced phase sensitive inversion recovery images were obtained.  Physical myocardial measurements were obtained by the physician using [employer] workstation. FINDINGS: LEFT VENTRICLE:  Left ventricle is mildly enlarged with severe systolic dysfunction. There is severe hypokinesis of the mid to apical anterior, mid to apical inferior, distal lateral, mid to distal inferolateral  and mid to apical anteroseptal wall segments.  There is akinesis of the mid to distal inferoseptal wall.  There is non-transmural infarction involving the basal to distal anterior wall, basal to mid anteroseptal wall, basal to apical lateral wall and basal to mid inferior wall which represent viable myocardium. There is transmural infarction of the mid to distal inferoseptal wall, and apical segment of the left ventricle.  No evidence of intraventricular thrombus.  LV EF:  29.8% (normal 56 to 78%) Absolute Volume: LV EDV: 164 mL (normal 52 to 141 mL) LV ESV:  115 mL (normal 13 to 51 mL) LV SV:  49 mL (normal  33 to 97 mL) CO:  4.2 L/minute (normal 2.8 to 8.8 L/minute) Myocardial Mass:  143 g (normal 75 to 175 g) Index Volumes: Height: 63 inches, Weight: 176 pounds LV EDV: 89 mL/sq-m (normal 41 to 81 mL/sq-m) LV ESV: 62 mL/sq-m (normal 12 to 20 mL/sq-m) LV SV:  27 mL/sq-m (normal 26 to 56 mL/sq-m) Cl:  2.3 L/minute/sq-m (normal 1.75 to 3.8 L/minute/sq-m) Myocardial Mass:  78 (63 to 95 g/sq-m) RIGHT VENTRICLE:  Right ventricle is normal size with normal right ventricular systolic function. ATRIA:  Mild left atrial enlargement.   Normal right atrial size. No atrial septal defect. AORTIC VALVE:  Tricuspid aortic valve with mild sclerosis.  No aortic insufficiency. No significant aortic stenosis. MITRAL VALVE:  No obvious mitral valve structure abnormality or mitral valve stenosis.  Mild mitral regurgitation. TRICUSPID VALVE:  Normal structure. No stenosis. Mild tricuspid regurgitation.  PULMONIC VALVE:  No significant pulmonic stenosis or regurgitation.  PERICARDIUM:  Trace pericardial effusion.  AORTA:  Normal thoracic size and its major branches. EXTRACARDIAC STRUCTURES:  Please refer to radiology interpretation for extracardiac structures IMPRESSIONS:  1.   Left ventricle is mildly enlarged with severe systolic dysfunction.  The quantified left ventricular ejection fraction is 29.8%. 2.  Non-transmural infarction  involving the basal to distal anterior wall, basal to mid anteroseptal wall, basal to apical lateral wall and basal to mid inferior wall which represent viable myocardium. 3.  Transmural infarction of the mid to distal inferoseptal wall, and apical segment of the left ventricle representing non-viable myocardium. 4.  Normal right ventricular size and systolic function.  5.  Mild left atrial enlargement.  6.  Mild mitral regurgitation.      Nm Thallium Rest W Spect    Result Date: 7/28/2017    1.Stress portion of test cancelled by Dr Dietz.   2.Rest images show a large defect involving the inferior wall from base to apex with perfusion seen involving the anterolateral wall as well as anterior septum. This most likely represents prior inferior scar.   3.Cannot determine if ischemia present since no stress portion done.   4.When compared to report of prior stress test from June 30, 2004 small ischemic inferior defect was seen on prior study which is been replaced by what appears to be possibly large inferior scar.      Mr Myocardium Overread    Result Date: 8/2/2017  OVERREAD: DETAILED [facility] EXTRACARDIAC OVERREAD OF CARDIAC MRI 07/31/2017, 7:44 PM INDICATION: Cardiomyopathy. COMPARISON: 07/29/2017. FINDINGS:  LIMITED CHEST: Negative. LIMITED UPPER ABDOMEN: Negative.     CONCLUSION: 1.  No significant incidental extracardiac findings. 2.  Please refer to cardiologist's dictation for the cardiac MRI report.     Mr Myocardium Overread    Result Date: 7/29/2017  OVERREAD: [facility] EXTRACARDIAC OVERREAD OF CARDIAC MRI 7/29/2017 12:13 PM INDICATION: Chest pain. COMPARISON: None. FINDINGS:  LIMITED CHEST: Small right pleural effusion. LIMITED MEDIASTINUM: Negative. LIMITED UPPER ABDOMEN: Negative.     CONCLUSION: 1.  Small right pleural effusion. 2.  Please refer to cardiologist's dictation for the cardiac MRI report.       I personally reviewed the images and my interpretation is that  the patient had had acute occlusion of the right common femoral artery could not be clearly visualized on the angiogram performed just a few days prior.  Post endarterectomy her flow was well restored..    LABS:      Sodium   Date Value Ref Range Status   08/16/2017 140 136 - 145 mmol/L Final   08/15/2017 139 136 - 145 mmol/L Final   08/14/2017 139 136 - 145 mmol/L Final     Potassium   Date Value Ref Range Status   08/16/2017 3.9 3.5 - 5.0 mmol/L Final   08/15/2017 4.2 3.5 - 5.0 mmol/L Final   08/14/2017 3.8 3.5 - 5.0 mmol/L Final     Chloride   Date Value Ref Range Status   08/16/2017 106 98 - 107 mmol/L Final   08/15/2017 105 98 - 107 mmol/L Final   08/14/2017 103 98 - 107 mmol/L Final     BUN   Date Value Ref Range Status   08/16/2017 14 8 - 22 mg/dL Final   08/15/2017 16 8 - 22 mg/dL Final   08/14/2017 15 8 - 22 mg/dL Final     Creatinine   Date Value Ref Range Status   08/16/2017 0.73 0.60 - 1.10 mg/dL Final   08/15/2017 0.78 0.60 - 1.10 mg/dL Final   08/14/2017 0.78 0.60 - 1.10 mg/dL Final     Hemoglobin   Date Value Ref Range Status   08/15/2017 11.2 (L) 12.0 - 16.0 g/dL Final   08/13/2017 11.4 (L) 12.0 - 16.0 g/dL Final   08/11/2017 11.0 (L) 12.0 - 16.0 g/dL Final     Platelets   Date Value Ref Range Status   08/15/2017 325 140 - 440 thou/uL Final   08/13/2017 259 140 - 440 thou/uL Final   08/11/2017 153 140 - 440 thou/uL Final     BNP   Date Value Ref Range Status   07/24/2017 2134 (H) 0 - 103 pg/mL Final   01/24/2010 1132 (H) <88 pg/mL Final     Comment:                    Likely moderate to severe CHF   01/21/2010 1207 (H) <88 pg/mL Final     Comment:                    Likely moderate to severe CHF     INR   Date Value Ref Range Status   08/16/2017 1.99 (H) 0.90 - 1.10 Final   08/15/2017 2.26 (H) 0.90 - 1.10 Final   08/14/2017 2.11 (H) 0.90 - 1.10 Final     Hilary Nieves MD  VASCULAR SURGERY

## 2021-06-12 NOTE — ANESTHESIA PROCEDURE NOTES
Arterial Line  Reason for Procedure: hemodynamic monitoring and multiple ABGs  Patient location during procedure: OR pre-induction  Start time: 8/4/2017 7:19 AM  End time: 8/4/2017 8:11 AM  Staffing:  Performing  Anesthesiologist: CHAD PALMER  Performing CRNA: RADHA COMBS  Sterile Precautions:  sterile barriers used during insertion: cap, mask, sterile gloves, large sheet, and hand hygiene used.  Arterial Line:   Immediately prior to procedure a time out was called to verify the correct patient, procedure, equipment, support staff and site/side marked as required  Laterality: right  Location: radial  Prepped with: ChloroPrep    Needle gauge: 20 G  Number of Attempts: 1  Secured with: tape, transparent dressing and pressure dressing  Flushed with: saline  1% lidocaine local anesthesia used for skin prep.   See MAR for additional medications given.  Ultrasound evaluation of access site: yes  Vessel patent by US exam    Concurrent real time visualization of needle entry

## 2021-06-12 NOTE — ANESTHESIA PREPROCEDURE EVALUATION
Anesthesia Evaluation      Patient summary reviewed   No history of anesthetic complications     Airway   Mallampati: II  Neck ROM: full   Pulmonary    (+) decreased breath sounds, a smoker    ROS comment: Emphysema                           Cardiovascular   (+) past MI, CAD, cardiomyopathy, hypercholesterolemia,     Rhythm: regular  Rate: normal,      ROS comment: Stenosis of left subclavian artery-Only check BP on right     Neuro/Psych    (+) depression,     Endo/Other    (+) diabetes mellitus type 2 using insulin,      GI/Hepatic/Renal       Other findings: Echo Limited  Order# [id number]     Reading physician: Henry Paul MD  Ordering physician: Erica Dietz MD  Study date: 17      Patient Information     Patient Name MRN Sex              Age    Bety Escobar [id number] Female 1950 (66 y.o.)     Indications     Cardiomyopathy; ischemic     Summary     1. Limited 2-dimensional echocardiogram.  2. The left ventricle is mildly enlarged. Left ventricular systolic performance is severely reduced. The ejection fraction is estimated to be 15-20%.    3. There is severe global reduction in left ventricular systolic performance with relative preservation of the posterior base and lateral base.   4. Normal right ventricular size with mild reduction in right ventricular systolic performance.     When compared to the prior real-time echocardiogram dated 2017, the findings are fairly similar on both studies.          Dental    (+) upper dentures and poor dentition                       Anesthesia Plan  Planned anesthetic: general endotracheal    ASA 4   Induction: intravenous   Anesthetic plan and risks discussed with: patient  Anesthesia plan special considerations: antiemetics, CVP line, arterial catheterization, pulmonary artery catheterization, IV therapy two IVs, dexmedetomidine  Post-op plan: extended intubation/vent support

## 2021-06-12 NOTE — ANESTHESIA CARE TRANSFER NOTE
Last vitals:   Vitals:    08/04/17 1727   BP: 126/46   Pulse: 90   Resp: 16   Temp: 37.3  C (99.2  F)   SpO2: 90%     Patient's level of consciousness is unresponsive  Spontaneous respirations: no: on vent  Maintains airway independently: no: has ETT  Dentition unchanged: yes  Oropharynx: endotracheal tube in place    QCDR Measures:  ASA# 20 - Surgical Safety Checklist: WHO surgical safety checklist completed prior to induction  PQRS# 430 - Adult PONV Prevention: NA - Not adult patient, not GA or 3 or more risk factors NOT present  ASA# 8 - Peds PONV Prevention: NA - Not pediatric patient, not GA or 2 or more risk factors NOT present  PQRS# 424 - Renetta-op Temp Management: 4559F - At least one body temp DOCUMENTED => 35.5C or 95.9F within required timeframe  PQRS# 426 - PACU Transfer Protocol:NA - Patient did not go to PACU  ASA# 14 - Acute Post-op Pain: ASA14B - Patient did NOT experience pain >= 7 out of 10

## 2021-06-12 NOTE — PROGRESS NOTES
Wound check visit today  S/P CABG x 3 with LIMA-LAD, SVG-Diag, SVG-Ramus, EVH RLE on 08/04/2017  History of embolectomy from right femoral 08/07/2017  Discharged to TCU on 08/16/2017 and present today for postop CV surgery evaluation  Seen in clinic for postop evaluation on 08/29/2017 and had the following findings  Lower sternal incision open, right groin is open with a deep wound measuring 2 long x 2 wide x 0.5 cm deep  Will order [employer] Wound Vac therapy to promote tissue granulation and wound healing, to be applied to Sternal wound and change Mon-Wed-Fri till wound is healed  She was on Keflex 750 mg TID X 14 days  Continue daily wound dressing X 10 days or till groin wounds heals, which ever come first   Brought back today for follow up wound checks  Lower sternal wound is open and measures 3 cm wide, by 2.5 cm long by 2 cm deep   The right below the knee EVH site wound is healing and now measures 2 cm long by 0.8 cm wide by 0.5 cm deep  Cultures swaps were taken and will be sent for gram stain and C&S  Bilateral groins wounds are healing well and almost closed  Will continue Keflex 750 TID X 10 days or till cultures are back  Will order wound vac therapy for the sternal wound  Follow up in 2 weeks for a wound check

## 2021-06-12 NOTE — PROGRESS NOTES
I was asked by Dr. Nieves to see patient regarding disruption of surgical incision in the right groin; left groin and left medial knee. Pt arrives today with her daughter and granddaughter. She is currently residing at [facility]; they have nurses there to do the wound care. Currently having dry gauze dressing changes. Recently underwent CABG on 8/4; with post operative occulusion of the CFA. Just completed course of Keflex tolerated well. She does have diabetes; her sugars are running high in the 270 range but are stable. She is having minimal pain in the incision sites. Denies fevers, chills. The right groin was noted to have an area of full thickness necrosis and separation centrally; and then a more minor area of separation more proximal; x3 staples were removed; a large bleb of tissue necrosis was removed per the direction of Dr. Nieves, there was no pulsatile mass underneath; minimal bleeding; no surrounding induration; no lymphatic drainage noted; no purulence; minimal pain; the other staples were left in place. The left groin there were no longer staples present there was superificial skin necrosis with very adherent yellow slough; this was left in place; no surrounding erythema. The right medial knee region; again superificial skin necrosis; brown/yellow discoloration; this was adherent and left in place; no surrounding fluctuance or erythema. Will have her facility begin to wash the wound daily with saline and apply thick layer of santyl ointment cover with bordered foam dressings. Follow up in 2 weeks; Dr. Nieves has also ordered arterial studies to be repeated at that time. Plan was d/w patient, family and Dr. Nieves.     Gypsy Odell DNP, RN, CNP, [facility]  [facility]  [phone]

## 2021-06-12 NOTE — PROGRESS NOTES
Follow up Vascular Visit       Date of Service:9/5/2017    Date Last Seen: Visit date not found; 8/23/2017    Chief Complaint: sternal wound; abdominal wounds; bilateral groin wounds; right medial knee wounds    History:   Past Medical History:   Diagnosis Date     Acute non-ST elevation myocardial infarction (NSTEMI) 7/8/2017     Acute non-ST elevation myocardial infarction (NSTEMI) 7/8/2017     Acute non-ST elevation myocardial infarction (NSTEMI) 7/8/2017     Cardiomyopathy 01/18/2010    EF 18% per Dr. Kebede with apparent improvement after PCI and medications     Coronary artery disease due to lipid rich plaque 07/07/2000    100% RCA with left to right collaterals since 1999, has had stents to LAD (twice), LCX (twice) and ramus, all by Dr. Kebede at [facility]     Diabetes mellitus type 2 in obese 1998    started on insulin in 2003     Dyslipidemia, goal LDL below 70 1999     Family history of myocardial infarction     mother, father, brother CABG     Peripheral Vascular Disease 6/21/2007    Aguilar Pope: s/p lower extremity stenting        Pt returns to the Vascular clinic with regards to their sternal, bilateral groin; abdominal, and right medial knee wounds; these are all from surgical incisions. She arrives today with her daughter. She was previously seen by Dr. Nieves; he ordered repeat ABIs today; these were stable to improved; I will d/w Dr. Nieves. She is now at home; she has home care daily. The santyl has not arrived; they believe this will be delivered tomorrow. Currently using wound gel or antibiotic ointments. She is not taking any supplements. She denies fevers, chills.     Allergies: Other environmental allergy    Physical Exam:    /64  Pulse 64  Resp 16  LMP  (LMP Unknown)    General:  Patient presents to clinic in no apparent distress.  Head: normocephalic atraumatic  Psychiatric:  Alert and oriented x3.   Respiratory: unlabored breathing; no cough  Integumentary:  Skin is uniformly  warm, dry and pink.    Wound #1 Location: sternum  Size: 2.5L x 2.9W x 2depth.  No sinus tract present, Wound base: 100% yellow slough; necrotic; this was debrided to reveal 10% viable wound bed; no bone probed;   +undermining present. Periwound: no denudement, trace erythema, induration, maceration or warmth.      Wound #2 Location: abdomen right lateral  Size: 0.2L x 1W x 0.1depth.  nosinus tract present, Wound base: 100% eschar; this was lifting this was debrided to reveal 80% viable wound bed  No undermining present. Periwound: no denudement, erythema, induration, maceration or warmth.      Wound #3 Location: abdomen mid  Size: 0.4L x 2W x 0.2depth.  No sinus tract present, Wound base: 100% eschar, this was lifting this was debrided to reveal 80% viable wound bed  No undermining present. Periwound: no denudement, erythema, induration, maceration or warmth.      Wound #4 Location: abdomen left later Size: 0.2L x 1W x 0.1depth.  No sinus tract present, Wound base: 100% eschar; this was lifting this was debrided to reveal 80% viable wound bed  No undermining present. Periwound: no denudement, erythema, induration, maceration or warmth.      Wound #5 Location: right groin  Size: 1.2L x 2W x 1.5depth.  No sinus tract present, Wound base: 50% yellow slough; this was debrided to reveal 80% viable wound bed;   2.5cm undermining present from 11-2 o'clock Periwound: no denudement, erythema, induration, maceration or warmth.      Wound #6 Location: left groin  Size: 0.7L x 1.5W x 1.1depth.  No sinus tract present, Wound base: 100% yellow slough this was debrided to reveal 30% viable wound bed; suture material in the bed  no undermining present. Periwound: no denudement, erythema, induration, maceration or warmth.      Wound #7 Location: right medial knee  Size: 1L x 2.5W x 1.8depth.  No sinus tract present, Wound base: 100% yellow slough this was debrided to reveal 30% viable wound bed; no undermining present. Periwound:  no denudement, erythema, induration, maceration or warmth.      Circumferential volume measures:    No flowsheet data found.      Ulceration(s)/Wound(s):     Wound 08/23/17 Right groin (Active)   Pre Size Length 1.2 9/5/2017  1:00 PM   Pre Size Width 2 9/5/2017  1:00 PM   Pre Size Depth 1.5 9/5/2017  1:00 PM   Pre Total Sq cm 2.4 9/5/2017  1:00 PM   Description Slough 8/23/2017 10:00 AM       Wound 08/23/17 Right medial knee (Active)   Pre Size Length 1 9/5/2017  1:00 PM   Pre Size Width 2.5 9/5/2017  1:00 PM   Pre Size Depth 1.8 9/5/2017  1:00 PM   Pre Total Sq cm 2.5 9/5/2017  1:00 PM   Description Slogh w/ reddness 8/23/2017 10:00 AM       Wound 08/23/17 Left groin (Active)   Pre Size Length 0.7 9/5/2017  1:00 PM   Pre Size Width 1.5 9/5/2017  1:00 PM   Pre Size Depth 1.1 9/5/2017  1:00 PM   Pre Total Sq cm 1.05 9/5/2017  1:00 PM       Wound 09/05/17 sterum (Active)   Pre Size Length 2.5 9/5/2017  1:00 PM   Pre Size Width 2.9 9/5/2017  1:00 PM   Pre Size Depth 2 9/5/2017  1:00 PM   Pre Total Sq cm 7.25 9/5/2017  1:00 PM       Lab Values  Lab Results   Component Value Date    SEDRATE 13 11/21/2011     Lab Results   Component Value Date    CREATININE 1.08 08/25/2017     Lab Results   Component Value Date    HGBA1C 6.7 (H) 08/08/2017     Lab Results   Component Value Date    BUN 21 08/25/2017     Lab Results   Component Value Date    ALBUMIN 2.5 (L) 08/25/2017     No results found for: [id number]          Impression:   Disruption of surgical incisions; sternum; abdomen; bilateral groin; right medial knee  S/p CABG with vein harvesting in the right leg  Malnutrition  Type 2 diabetes with other skin complications  PAD        Are any of these wounds new today: No; Location: na    Assessment/Plan:          1. Excisional debridement of the ulcer(s) was recommended today, after consent was obtained and 2% Xylocaine was applied using a sterile curet the epidermal, dermal and down into the subcutaneous tissue was sharply  debrided for a total square cm of 10.45. The devitalized and necrotic tissue was removed and the wounds appeared much  afterwards. The patient tolerated this well.           2. Edema: single tubigrip on the right leg; very light; PAD hx; will speak to Dr. Nieves about her RONNY results           3.  Wound treatment:Santyl to all wounds; once per day; cleanse with saline; cover with mepilex border; continue home care; Will speak to Dr. Nieves about the suture material in the left groin wound see if we can remove this           4. Nutrition: spoke about her protein levels; will focus on protein in the diet           5. Offloading: na     Patient will follow up with me in 3 weeks for reevaluation. They were instructed to call the clinic sooner with any signs or symptoms of infection or any further questions/concerns. Answered all questions.    Gypsy Odell DNP, RN, CNP, [facility]  [facility]  [phone]

## 2021-06-12 NOTE — PROGRESS NOTES
Surgical f/u, Pt is s/p right femoral thrombectomy 8/7/2017, underwent CABG 08/4/17, was found to have a post procedure occlusion of the CFA, presumably from thrombus due to artrial line. Recent fall at TCU concerned about incision site, right groin is partially dehisced, there is slough present in the incision, there is also a post - surgical wound in the left groin that is macerated w/ slough. There is no induration, drainage, bleeding, Staples have not been removed from right groin. Bilateral groin is fairly moist w/ reddness. There is also a right medial knee wound that measure 3.0 x 0.5 CM, currently be covered w/ foam dressing. Was on Keflex secondary to cellulitis, right leg is slightly red w/ edema. Patients sternotomy is healing, she is following with cardiology, currently in a TCU.

## 2021-06-12 NOTE — PROGRESS NOTES
Patient is currently admitted to card, plan is for coronary bypass. Patient will be scheduled for LE angio at later date.

## 2021-06-12 NOTE — ANESTHESIA POSTPROCEDURE EVALUATION
Patient: Bety Escobar  CORONARY ARTERY BYPASS X 4 RIGHT LEG ENDOSCOPIC VEIN HARVEST RIGHT INTERNAL MAMMARY ARTERY ANESTHESIA,TRANSESOPHAGEAL,ECHOCARGIOGRAM, ACCESS OF THE RIGHT CFA WITH 5 Turkish SHEATH  Anesthesia type: general    Patient location: ICU  Last vitals:   Vitals:    08/05/17 0630   BP:    Pulse: 89   Resp:    Temp:    SpO2: 98%     Post vital signs: stable  Level of consciousness: awake and sedation recently turned off and pt has just been extubated.  Slow to follow commands, possibly from residual sedation.  Post-anesthesia pain: pain controlled  Post-anesthesia nausea and vomiting: no  Pulmonary: unassisted, return to baseline  Cardiovascular: stable and blood pressure at baseline  Hydration: adequate  Anesthetic events: no    QCDR Measures:  ASA# 11 - Renetta-op Cardiac Arrest: ASA11B - Patient did NOT experience unanticipated cardiac arrest  ASA# 12 - Renetta-op Mortality Rate: ASA12B - Patient did NOT die  ASA# 13 - PACU Re-Intubation Rate: Direct transfer to ICU with ETT.  Extubated just recently.  ASA# 10 - Composite Anes Safety: ASA10A - No serious adverse event  ASA# 38 - New Corneal Injury: ASA38A - No new exposure keratitis or corneal abrasion in PACU    Additional Notes:

## 2021-06-12 NOTE — ANESTHESIA PROCEDURE NOTES
CELINE    Patient location during procedure: OR  Start time: 8/4/2017 8:38 AM  Staffing:  Performing  Anesthesiologist: CHAD PALMER  CELINE:  Type/Reason: Monitoring CELINE  Technique: blind insertion  Difficulty: easy

## 2021-06-12 NOTE — ANESTHESIA POSTPROCEDURE EVALUATION
Patient: Bety Escobar  EXPLORE FEMORAL ARTERY  Anesthesia type: general    Patient location: ICU  Last vitals:   Vitals:    08/07/17 0357   BP: 106/59   Pulse: 69   Resp: 16   Temp:    SpO2: 99%     Post vital signs: stable  Level of consciousness: sedated  Post-anesthesia pain: pain controlled  Post-anesthesia nausea and vomiting: no  Pulmonary: ETT, ventilator  Cardiovascular: stable and blood pressure at baseline  Hydration: adequate  Anesthetic events: no    QCDR Measures:  ASA# 11 - Renetta-op Cardiac Arrest: ASA11B - Patient did NOT experience unanticipated cardiac arrest  ASA# 12 - Renetta-op Mortality Rate: ASA12B - Patient did NOT die  ASA# 13 - PACU Re-Intubation Rate: ASA13X - Exclusion: organ donor or direct ICU transfer  ASA# 10 - Composite Anes Safety: ASA10A - No serious adverse event  ASA# 38 - New Corneal Injury: ASA38A - No new exposure keratitis or corneal abrasion in PACU    Additional Notes:

## 2021-06-12 NOTE — PROGRESS NOTES
"  Assessment:       1. S/P CABG x 3 with LIMA-LAD, SVG-Diag, SVG-Ramus, EVH RLE on 08/04/2017  carvedilol (COREG) 3.125 MG tablet    CANCELED: Wound culture   2. History of embolectomy from right femoral 08/07/2017          Plan:   S/P CABG x 3 with LIMA-LAD, SVG-Diag, SVG-Ramus, EVH RLE on 08/04/2017  History of embolectomy from right femoral 08/07/2017  Discharged to TCU on 08/16/2017 and present today for postop CV surgery evaluation  Denies incisional chest pain, but complaint of pain on the left side of chest   No shortness of breath and lungs clear, no sternal instability appreciated  Lower sternal incision open, culture swap collected  Right groin is open with a deep wound measuring 2 long x 2 wide x 0.5 cm deep  Currently on Keflex 500 mg TID X 10 days  Hypotensive with symptomatic autostatic changes, BP 80/48  Coreg 3.125 BID   Patient is scheduled to be seen in the vascular clinic 09/05/2017  Patient may need wound vac therapy  Follow up with me on 09/13/2017 at 10 AM for wound check    BP (!) 80/48 (Patient Site: Left Arm, Patient Position: Sitting, Cuff Size: Adult Regular)  Pulse 78  Resp 16  Ht 5' 3\" (1.6 m)  Wt 172 lb 4.8 oz (78.2 kg)  LMP  (LMP Unknown)  BMI 30.52 kg/m2        Current Outpatient Prescriptions   Medication Sig     acetaminophen (TYLENOL) 325 MG tablet Take 2 tablets (650 mg total) by mouth every 4 (four) hours as needed.     aspirin 81 MG EC tablet Take 81 mg by mouth daily.      atorvastatin (LIPITOR) 80 MG tablet Take 80 mg by mouth at bedtime.      BD INSULIN PEN NEEDLE UF SHORT 31 gauge x 5/16\" Ndle USE TO INJECT INSULIN THREE TIMES DAILY WITH MEALS     blood glucose control, normal Soln Use As Directed 3 (three) times a day. TRUEtest Control Level 1 In Vitro Liquid     blood glucose test strips Use 1 each As Directed 4 (four) times a day before meals and at bedtime. Dispense brand per patient's insurance at pharmacy discretion.     blood-glucose meter Misc Use glucometer to " "test blood sugar 4 times per day.     buPROPion (WELLBUTRIN SR) 150 MG 12 hr tablet Take 150 mg by mouth 2 (two) times a day.      clopidogrel (PLAVIX) 75 mg tablet Take 75 mg by mouth daily.      DULoxetine (CYMBALTA) 60 MG capsule Take 60 mg by mouth daily.      furosemide (LASIX) 40 MG tablet Take one 40 mg tablet by mouth morning and evening for the next two days  Continue with on 40 mg tablet by mouth every morning thereafter     insulin aspart (NOVOLOG FLEXPEN) 100 unit/mL injection pen Inject 2 Units under the skin 3 (three) times a day before meals.     insulin syringe-needle U-100 (BD INSULIN SYRINGE) 1 mL 25 x 5/8\" Syrg Use As Directed 4 (four) times a day.     LANCETS MISC Use As Directed 3 (three) times a day.     LANCING DEVICE MISC Use As Directed 3 (three) times a day.     LANTUS 100 unit/mL injection Inject 18 Units under the skin at bedtime.     lisinopril (PRINIVIL,ZESTRIL) 5 MG tablet Take 1 tablet (5 mg total) by mouth daily.     NEEDLES, INSULIN DISPOSABLE (BD INSULIN PEN NEEDLE UF MINI MISC) Use As Directed. 31 G x 5 MM     OLANZapine zydis (ZYPREXA) 5 MG disintegrating tablet Take 1 tablet (5 mg total) by mouth daily.     oxyCODONE (ROXICODONE) 5 MG immediate release tablet Take 1-2 tablets (5-10 mg total) by mouth every 6 (six) hours as needed for pain (5 mg for mild to moderate paint (pain score 3-4) or 10 mg for moderate pain (pain score 5-6)).     senna-docusate (SENNOSIDES-DOCUSATE SODIUM) 8.6-50 mg tablet Take 1 tablet by mouth 2 (two) times a day.     WARFARIN SODIUM (WARFARIN ORAL) Take by mouth. Adjust dose based on INR results as directed.     carvedilol (COREG) 3.125 MG tablet Take 1 tablet (3.125 mg total) by mouth 2 (two) times a day with meals.     Subjective:        Patient ID: Bety Escobar is a 66 y.o. female.    Chief Complaint:  HPI  The following portions of the patient's history were reviewed and updated as appropriate: allergies, current medications, past family " history, past medical history, past social history, past surgical history and problem list.  Mrs Escobar is a 66 years old female who underwent CABG x 3 with LIMA-LAD, SVG-Diag, SVG-Ramus, EVH RLE on 08/04/2017 at [facility] by Dr Wagoner. Post operatively she developed a cold right leg and had embolectomy from right femoral 08/07/2017 by general surgery. Patient has a history severe arterial disease. Her postop recovery was also complicated by respiratory failure requiring prolong mechanical ventilation. She was eventually discharged to TCU on 08/16/2017 where she still resides. She present today with her daughter, for postop CV surgery evaluation  She denies incisional chest pain, but complaint of pain on the left side of chest. She denies shortness of breath, lungs sounds are clear and no sternal instability appreciated. The lower aspect of her sternal incisions is open and cultures swap was collected. The site of right femoral endarterectomy and right femoral thrombectomy is open. With necrotic eschar, measuring 2 long x 2 wide x 0.5 cm deep. Some of the necrotic tissue was debrided and the wound was packed with wet to dry dressing and instructions were sent to TCU for twice a day wet to dry dressing change. Patient is scheduled to follow up for a wound check in 2 weeks. She had been started on Keflex 500 mg TID by the TCU physician and was advised to continue with the therapy to completion. At presentation today she is hypotensive with symptomatic autostatic changes, BP 80/48. Will reduce her Coreg dose to Coreg 3.125 BID. Patient is scheduled to be seen in the vascular clinic on 09/05/2017 and will follow up with me for a wound check on up with me on 09/13/2017 at 10 AM for wound check.        Review of Systems   Constitution: Negative.   HENT: Negative.    Eyes: Negative.    Cardiovascular: Negative.    Respiratory: Negative.    Hematologic/Lymphatic: Negative.    Skin: Negative.     Musculoskeletal: Negative.    Gastrointestinal: Negative.    Genitourinary: Negative.    Neurological: Negative.    Psychiatric/Behavioral: Negative.           Objective:     Physical Exam   Constitutional: She appears healthy. No distress.   HENT:   Nose: Nose normal.   Mouth/Throat: Dentition is normal. Oropharynx is clear.   Eyes: Conjunctivae are normal. Pupils are equal, round, and reactive to light.   Neck: Normal range of motion and thyroid normal. Neck supple.   Pulmonary/Chest: Effort normal and breath sounds normal. She has no wheezes. She has no rales. She exhibits no tenderness.   Abdominal: Soft. Bowel sounds are normal.   Musculoskeletal: Normal range of motion.   Neurological: She is alert and oriented to person, place, and time. She has normal motor skills, normal reflexes and intact cranial nerves. Gait normal.   Skin: Skin is warm and dry.

## 2021-06-12 NOTE — PROGRESS NOTES
Assessment/Plan:     1.  Coronary artery disease - s/p LUCA on July 7 to mid LAD and diagonal:  Bety Escobar will continue on dual antiplatelet therapy with aspirin 81 mg indefinitely and clopidogrel 75 mg for 1 year.  We discussed the importance of antiplatelet therapy and talking with her cardiologist prior to stopping these medications for any reason.  Puncture site is soft, nontender and healing well.      2.  Dyslipidemia: Bety Escobar is on high intensity statin therapy with atorvastatin 80 mg daily.  We discussed a diet low in saturated fat, weight loss, and exercise along with medication for better control of cholesterol.     3.  Hypotension: Her blood pressures since discharge from the hospital have been extremely low.  She is clammy and diaphoretic.     4.  Diabetes: She states that her sugars have been fairly well controlled at home.  Her primary care provider can manage the diabetes    5.  Shortness of breath: She can barely get enough breath to talk in a full sentence.  I am quite concerned that she may have a PE.  She is diaphoretic and clammy.  She states that she has been this way for approximately 1 week.  She says every time she walks she feels like she is going to pass out and cannot be sure that she has not passed out at home.    I am going to send her to the emergency room because I am quite concerned about her.  I would like them to get a CT of her chest to rule out PE.  I have also instructed her to stop her Coreg.  She is scheduled to see Dr. Flores on August 28    Subjective:     Bety Escobar is seen at [facility] for post coronary intervention follow up.  This is a 66-year-old female with long-standing history of coronary artery disease as well as peripheral vascular disease.  She is status post stenting of the LAD, circumflex and ramus.  She has also had peripheral artery stenting.  She was having recurrent angina with dyspnea on exertion and was scheduled for cardiac  catheterization. Her coronary artery disease risk factors include obesity, dyslipidemia, diabetes, physical inactivity and unhealthy diet.      On July 7 she underwent angiogram which showed a known  of the RCA, but also showed an 80% lesion in the diagonal and 85% lesion in the mid LAD.  2 drug-eluting stents were placed.  She had some low blood pressures during the angiogram and then post procedure.  Because of that her Lasix and lisinopril were stopped upon discharge.  She was started on dual antiplatelet therapy with aspirin and clopidogrel.    She states that she has not been feeling well since she has been home from the hospital.  Within the last week she states that she has been very short of breath and has been unable to do anything.  She feels like she has something sitting on her chest.  She has been taking her blood pressures daily and they have been ranging anywhere from  systolically.  She is very tearful during the visit and expresses her fear and concern that something is very wrong with her.      Review of Systems:   12 point review of systems was reviewed and was negative except for those noted in the HPI     Patient Active Problem List   Diagnosis     Anemia     Peripheral Neuropathy     Dyslipidemia, goal LDL below 70     Osteoarthritis     Coronary artery disease due to lipid rich plaque     Type 2 diabetes mellitus with circulatory disorder, with long-term current use of insulin     Peripheral Vascular Disease     Severe Recurrent Major Depression     Cardiomyopathy     Acute systolic heart failure     Ischemic cardiomyopathy     Acute non-ST elevation myocardial infarction (NSTEMI)       Past Medical History:   Diagnosis Date     Acute non-ST elevation myocardial infarction (NSTEMI) 7/8/2017     Cardiomyopathy 01/18/2010    EF 18% per Dr. Kebede with apparent improvement after PCI and medications     Coronary artery disease due to lipid rich plaque 07/07/2000    100% RCA with left to  right collaterals since 1999, has had stents to LAD (twice), LCX (twice) and ramus, all by Dr. Kebede at [facility]     Diabetes mellitus type 2 in obese 1998    started on insulin in 2003     Dyslipidemia, goal LDL below 70 1999     Peripheral Vascular Disease 6/21/2007    Aguilar Pope: s/p lower extremity stenting        Past Surgical History:   Procedure Laterality Date     ANGIOPLASTY / STENTING FEMORAL Right 06/03/2008     ANGIOPLASTY / STENTING FEMORAL Left 06/21/2007     CARDIAC CATHETERIZATION N/A 7/7/2017    Procedure: Coronary Angiogram;  Surgeon: Manjeet Sepulveda MD;  Location: [facility];  Service:      CORONARY STENT PLACEMENT  1999    LAD and LCX     CORONARY STENT PLACEMENT  01/18/2010    distal LCX by Dr. Kebede at [facility]     CORONARY STENT PLACEMENT  02/16/2010    mid LAD by Dr. Kebede at [facility]     CORONARY STENT PLACEMENT  06/14/2011    ramus by Dr. Kebede at [facility]; chronic 100% RCA known since 1999     CORONARY STENT PLACEMENT  07/07/2017    LUCA 3.5 x 20 to prox mid LAD and LUCA to DG by Dr. Sepulveda     ILIAC ARTERY STENT Left 03/25/2009     MD KNEE SCOPE,DIAGNOSTIC      Description: Arthroscopy Knee;  Proc Date: 02/13/2004;  Comments: Arthroscopy of the left knee with partial media mesisectomy due to meniscus tear.  Dr. Alex Robles.     TONSILLECTOMY AND ADENOIDECTOMY       TUBAL LIGATION         Family History   Problem Relation Age of Onset     Alzheimer's disease Mother      Amputation of Leg Below Knee Mother      Prostate cancer Father      No Medical Problems Daughter      No Medical Problems Son      No Medical Problems Daughter      Breast cancer Neg Hx        Social History     Social History     Marital status:      Spouse name: N/A     Number of children: 3     Years of education: N/A     Occupational History      Disabled     Social History Main Topics     Smoking status: Former Smoker     Packs/day: 1.00     Years: 33.00     Types:  "Cigarettes     Start date: 1/1/1977     Quit date: 1/25/2010     Smokeless tobacco: Never Used     Alcohol use No     Drug use: No     Sexual activity: Not Currently     Partners: Male     Other Topics Concern     Not on file     Social History Narrative    Lives in a house with her son in law and grandson, Juan Francisco.         No current facility-administered medications for this visit.      Current Outpatient Prescriptions   Medication Sig Dispense Refill     acetaminophen-codeine (TYLENOL #3) 300-30 mg per tablet Take 1-2 tablets by mouth every 6 (six) hours as needed for pain. 30 tablet 2     aspirin 81 MG EC tablet Take 81 mg by mouth daily.       atorvastatin (LIPITOR) 80 MG tablet TAKE 1 TABLET BY MOUTH EVERY DAY 90 tablet 3     BD INSULIN PEN NEEDLE UF SHORT 31 gauge x 5/16\" Ndle USE TO INJECT INSULIN THREE TIMES DAILY WITH MEALS 100 each 11     blood glucose control, normal Soln Use As Directed 3 (three) times a day. TRUEtest Control Level 1 In Vitro Liquid       blood glucose test strips Use 1 each As Directed 4 (four) times a day before meals and at bedtime. Dispense brand per patient's insurance at pharmacy discretion. 200 strip 11     blood-glucose meter Misc Use glucometer to test blood sugar 4 times per day. 1 each 0     buPROPion (WELLBUTRIN SR) 150 MG 12 hr tablet TAKE 1 TABLET BY MOUTH TWICE DAILY 180 tablet 3     carvedilol (COREG) 3.125 MG tablet TAKE 1 TABLET BY MOUTH TWICE DAILY 180 tablet 3     clopidogrel (PLAVIX) 75 mg tablet Take 75 mg by mouth daily.       DULoxetine (CYMBALTA) 60 MG capsule TAKE 1 CAPSULE BY MOUTH DAILY 90 capsule 10     insulin aspart (NOVOLOG FLEXPEN) 100 unit/mL injection pen Inject 10 Units under the skin 3 (three) times a day before meals. 15 mL 0     insulin syringe-needle U-100 (BD INSULIN SYRINGE) 1 mL 25 x 5/8\" Syrg Use As Directed 4 (four) times a day.       ketorolac (TORADOL) 10 mg tablet Take 1 tablet (10 mg total) by mouth every 6 (six) hours. 60 tablet 0     " LANCETS MISC Use As Directed 3 (three) times a day.       LANCING DEVICE MISC Use As Directed 3 (three) times a day.       LANTUS 100 unit/mL injection ADMINISTER 45 UNITS UNDER THE SKIN AT BEDTIME 10 mL 11     NEEDLES, INSULIN DISPOSABLE (BD INSULIN PEN NEEDLE UF MINI MISC) Use As Directed. 31 G x 5 MM       Facility-Administered Medications Ordered in Other Visits   Medication Dose Route Frequency Provider Last Rate Last Dose     nitroglycerin SL tablet 0.4 mg (NITROSTAT)  0.4 mg Sublingual Q5 Min PRN Verónica Tena DO   0.4 mg at 07/24/17 1450       Allergies   Allergen Reactions     Other Environmental Allergy Dermatitis     metals       Objective:     Vitals:    07/24/17 1340   BP: (!) 82/58   Pulse: 76     There is no height or weight on file to calculate BMI.  Her oxygen saturation was 99%      General Appearance:   Alert, cooperative and in no acute distress.   HEENT:  No scleral icterus; the mucous membranes were pink and moist.   Chest: The spine was straight. The chest was symmetric.   Lungs:   Respirations unlabored; the lungs are clear to auscultation.   Cardiovascular:   Regular rhythm. S1 and S2 without murmur, clicks or rubs. Carotid pulses are intact and symmetrical.  No carotid bruits noted.   Abdomen:  Soft, nontender, nondistended, bowel sounds present   Extremities: No cyanosis, clubbing, or edema.   Skin: No xanthelasma.   Neurologic: Mood and affect are appropriate.   Puncture site: Left radial and Right femoral site is soft with no bruising.  Radial pulses and Pedal pulses intact and symmetrical.  CMS intact.         Lab Review   Lab Results   Component Value Date    CREATININE 0.85 07/24/2017    BUN 21 07/24/2017     07/24/2017    K 3.9 07/24/2017     (H) 07/24/2017    CO2 24 07/24/2017     Creatinine (mg/dL)   Date Value   07/24/2017 0.85   07/10/2017 0.79   07/09/2017 1.03   07/08/2017 0.89       Cardiographics  Echocardiogram: Reviewed by myself.  Echo done on June 29  showed EF 40% with no valvular abnormality      25 minutes were spent with the patient with greater than 50% spent on education and counseling.      Tori Payne PA-C, MPAS  [facility]

## 2021-06-12 NOTE — ANESTHESIA PREPROCEDURE EVALUATION
Anesthesia Evaluation      Patient summary reviewed   No history of anesthetic complications     Airway   Mallampati: II  Neck ROM: full   Pulmonary    (+) COPD, a smoker    ROS comment: Emphysema                           Cardiovascular   (+) past MI, CAD, cardiomyopathy (EF 15%), hypercholesterolemia, PVD    ECG reviewed  Rhythm: regular  Rate: normal,      ROS comment: Stenosis of left subclavian artery-Only check BP on right  Acue CFA thrombus       Neuro/Psych    (+) depression,     Comments: Delerium since CABG 2 days ago.      Endo/Other    (+) diabetes mellitus type 2 using insulin, obesity,      Comments: anemia    GI/Hepatic/Renal       Other findings: Echo Limited  Order# [id number]     Reading physician: Henry Paul MD  Ordering physician: Erica Dietz MD  Study date: 17      Patient Information     Patient Name MRN Sex              Age    Bety Escobar [id number] Female 1950 (66 y.o.)     Indications     Cardiomyopathy; ischemic     Summary     1. Limited 2-dimensional echocardiogram.  2. The left ventricle is mildly enlarged. Left ventricular systolic performance is severely reduced. The ejection fraction is estimated to be 15-20%.    3. There is severe global reduction in left ventricular systolic performance with relative preservation of the posterior base and lateral base.   4. Normal right ventricular size with mild reduction in right ventricular systolic performance.     When compared to the prior real-time echocardiogram dated 2017, the findings are fairly similar on both studies.          Dental    (+) upper dentures and poor dentition                         Anesthesia Plan  Planned anesthetic: general endotracheal  Pre op Gluc 183  Dopamine inline  Glidescope  Avoid versed because of delerium    ASA 4 - emergent   Induction: intravenous   Anesthetic plan and risks discussed with: child/children (Consented by telephone with daughter Donna.  Pt is  sedated. with delerium, unable to consent self)  Anesthesia plan special considerations: antiemetics, IV therapy two IVs, dexmedetomidine  Post-op plan: extended intubation/vent support

## 2021-06-12 NOTE — ANESTHESIA PROCEDURE NOTES
Central line    Start time: 8/4/2017 8:23 AM  End time: 8/4/2017 8:34 AM  Patient location: OR Post-induction  Indications: central pressure monitoring and vascular access  Performing Anesthesiologist: CHAD PALMER  Pre-procedure Checklist  Completed: patient identified, site marked, risks, benefits, and alternatives discussed, timeout performed, consent obtained, hand hygiene performed, all elements of maximal sterile barriers used including cap, mask, gown, sterile gloves, and large sheet and skin prep agent completely dried prior to procedure    Procedure Details:  Preparation: 2% chlorhexidine  Location details: right internal jugular  Catheter type: Introducer with [place]  Introducer type: MAC  Lumens:quad lumenNumber of attempts: 1    Post-procedure:   line sutured and Antimicrobial disks with CHG applied  Assessment: blood return through all ports and free fluid flow  Complications: none

## 2021-06-12 NOTE — PROGRESS NOTES
Patient was identified as a potential candidate for the Clinic Care Coordination program and has declined participating. I have reached out to this patient and the patient decline participation at this time. I will discontinue outreach to the patient at this time. I have notified the patient s physician by task. If the provider feels this patient is a good fit in the future I have asked them to resend to the [facility] referral bucket. Patient hang up the phone before care guide can provided the patient with my contact information if should she change her mind.

## 2021-06-12 NOTE — ANESTHESIA CARE TRANSFER NOTE
Last vitals:   Vitals:    08/07/17 0357   BP: 106/59   Pulse: 69   Resp: 16   Temp:    SpO2: 99%     Patient's level of consciousness is sedated and intubated  Spontaneous respirations: no: intubated  Maintains airway independently: no: intubated  Dentition unchanged: yes  Oropharynx: endotracheal tube in place    QCDR Measures:  ASA# 20 - Surgical Safety Checklist: WHO surgical safety checklist completed prior to induction  PQRS# 430 - Adult PONV Prevention: 4558F-1P - Medical reason for NOT administering combination therapy  ASA# 8 - Peds PONV Prevention: NA - Not pediatric patient, not GA or 2 or more risk factors NOT present  PQRS# 424 - Renetta-op Temp Management: 4559F - At least one body temp DOCUMENTED => 35.5C or 95.9F within required timeframe  PQRS# 426 - PACU Transfer Protocol:NA - Patient did not go to PACU  ASA# 14 - Acute Post-op Pain: NA - Patient under age 10y or did not go to PACU

## 2021-06-12 NOTE — PROGRESS NOTES
Thank you for asking the [facility] team to see Bety Escobar      Assessment/Plan:     Ischemic cardiomyopathy - check BNP, BMP and CBC today as well as a chest xray giving diminished breath sounds. Unable to titrate low dose coreg or lisinopril due to hypotension. On lasix. F/U with CHF NP in 2-4 weeks.    CAD - s/p CABG. On clopidogrel, aspirin and statin. Seeing surgical PA next week. Continue to rehab.    Sinus node dysfunction - seen in the hospital. Consider outpatient holter monitor.     PAD - seeing Dr. Nieves regularly. On Coumadin.          Current History:   Bety Escobar is a 66 y.o. with a very complex recent history. She is a former smoker with diabetes, copd, and PAD who was recently admitted with dyspnea and found to have systolic heart failure, multivessel coronary disease including the left main, severe left subclavian stenosis. She underwent CABG with the KAMARI instead of the LIMA (KAMARI-LAD, SVG-ramus, SVG-diag). Post op she had an acute leg event of the right femoral artery and underwent endarterectomy and was started on coumadin. EF before discharge was 35% with anterior wall hypokinesis. She was noted to have sinus note dysfunction while in the hospital and was only on low dose coreg as a result.     Bety is at a rehab facility and feels that she is getting stronger. She was only discharged last week. She is tired and notes she hasn't been sleeping well due to a roommate situation, that was just resolved. She denies chest pain or edema. No early satiety. No palpitations. She does still get short of breath with some activities.     Past Medical History:     Past Medical History:   Diagnosis Date     Acute non-ST elevation myocardial infarction (NSTEMI) 7/8/2017     Acute non-ST elevation myocardial infarction (NSTEMI) 7/8/2017     Acute non-ST elevation myocardial infarction (NSTEMI) 7/8/2017     Cardiomyopathy 01/18/2010    EF 18% per Dr. Kebede with apparent improvement after  PCI and medications     Coronary artery disease due to lipid rich plaque 07/07/2000    100% RCA with left to right collaterals since 1999, has had stents to LAD (twice), LCX (twice) and ramus, all by Dr. Kebede at [facility]     Diabetes mellitus type 2 in obese 1998    started on insulin in 2003     Dyslipidemia, goal LDL below 70 1999     Family history of myocardial infarction     mother, father, brother CABG     Peripheral Vascular Disease 6/21/2007    Aguilar Pope: s/p lower extremity stenting        Past Surgical History:     Past Surgical History:   Procedure Laterality Date     ANGIOPLASTY / STENTING FEMORAL Right 06/03/2008     ANGIOPLASTY / STENTING FEMORAL Left 06/21/2007     CARDIAC CATHETERIZATION N/A 7/7/2017    Procedure: Coronary Angiogram;  Surgeon: Manjeet Sepulveda MD;  Location: [facility];  Service:      CARDIAC CATHETERIZATION N/A 7/28/2017    Procedure: Coronary Angiogram;  Surgeon: Genoveva Flores MD;  Location: [facility];  Service:      CORONARY ARTERY BYPASS GRAFT N/A 8/4/2017    Procedure: CORONARY ARTERY BYPASS X 4 RIGHT LEG ENDOSCOPIC VEIN HARVEST RIGHT INTERNAL MAMMARY ARTERY ANESTHESIA,TRANSESOPHAGEAL,ECHOCARGIOGRAM, ACCESS OF THE RIGHT CFA WITH 5 Kiswahili SHEATH;  Surgeon: Vicente Wagoner MD;  Location: [facility];  Service:      CORONARY STENT PLACEMENT  1999    LAD and LCX     CORONARY STENT PLACEMENT  01/18/2010    distal LCX by Dr. Kebede at [facility]     CORONARY STENT PLACEMENT  02/16/2010    mid LAD by Dr. Kebede at [facility]     CORONARY STENT PLACEMENT  06/14/2011    ramus by Dr. Kebede at [facility]; chronic 100% RCA known since 1999     CORONARY STENT PLACEMENT  07/07/2017    LUCA 3.5 x 20 to prox mid LAD and LUCA to DG by Dr. Sepulveda     FEMORAL ARTERY REPAIR Right 8/7/2017    Procedure: EXPLORE FEMORAL ARTERY;  Surgeon: Roni Archer MD;  Location: [facility] OR;  Service:      ILIAC ARTERY STENT Left 03/25/2009      "WI KNEE SCOPE,DIAGNOSTIC      Description: Arthroscopy Knee;  Proc Date: 02/13/2004;  Comments: Arthroscopy of the left knee with partial media mesisectomy due to meniscus tear.  Dr. Alex Robles.     TONSILLECTOMY AND ADENOIDECTOMY       TUBAL LIGATION         Family History:     Family History   Problem Relation Age of Onset     Alzheimer's disease Mother      Amputation of Leg Below Knee Mother      CABG Mother      Prostate cancer Father      CABG Father      No Medical Problems Daughter      No Medical Problems Son      No Medical Problems Daughter      CABG Brother      Breast cancer Neg Hx        Social History:    reports that she quit smoking about 7 years ago. Her smoking use included Cigarettes. She started smoking about 40 years ago. She has a 33.00 pack-year smoking history. She has never used smokeless tobacco. She reports that she does not drink alcohol or use illicit drugs.    Meds:     Current Outpatient Prescriptions   Medication Sig     acetaminophen (TYLENOL) 325 MG tablet Take 2 tablets (650 mg total) by mouth every 4 (four) hours as needed.     aspirin 81 MG EC tablet Take 81 mg by mouth daily.      atorvastatin (LIPITOR) 80 MG tablet Take 80 mg by mouth at bedtime.      BD INSULIN PEN NEEDLE UF SHORT 31 gauge x 5/16\" Ndle USE TO INJECT INSULIN THREE TIMES DAILY WITH MEALS     blood glucose control, normal Soln Use As Directed 3 (three) times a day. TRUEtest Control Level 1 In Vitro Liquid     blood glucose test strips Use 1 each As Directed 4 (four) times a day before meals and at bedtime. Dispense brand per patient's insurance at pharmacy discretion.     blood-glucose meter Misc Use glucometer to test blood sugar 4 times per day.     buPROPion (WELLBUTRIN SR) 150 MG 12 hr tablet Take 150 mg by mouth 2 (two) times a day.      carvedilol (COREG) 6.25 MG tablet Take 1 tablet (6.25 mg total) by mouth 2 (two) times a day.     clopidogrel (PLAVIX) 75 mg tablet Take 75 mg by mouth daily.      " "DULoxetine (CYMBALTA) 60 MG capsule Take 60 mg by mouth daily.      furosemide (LASIX) 40 MG tablet Take one 40 mg tablet by mouth morning and evening for the next two days  Continue with on 40 mg tablet by mouth every morning thereafter     insulin aspart (NOVOLOG FLEXPEN) 100 unit/mL injection pen Inject 2 Units under the skin 3 (three) times a day before meals.     insulin syringe-needle U-100 (BD INSULIN SYRINGE) 1 mL 25 x 5/8\" Syrg Use As Directed 4 (four) times a day.     LANCETS MISC Use As Directed 3 (three) times a day.     LANCING DEVICE MISC Use As Directed 3 (three) times a day.     LANTUS 100 unit/mL injection Inject 18 Units under the skin at bedtime.     lisinopril (PRINIVIL,ZESTRIL) 5 MG tablet Take 1 tablet (5 mg total) by mouth daily.     NEEDLES, INSULIN DISPOSABLE (BD INSULIN PEN NEEDLE UF MINI MISC) Use As Directed. 31 G x 5 MM     OLANZapine zydis (ZYPREXA) 5 MG disintegrating tablet Take 1 tablet (5 mg total) by mouth daily.     oxyCODONE (ROXICODONE) 5 MG immediate release tablet Take 1-2 tablets (5-10 mg total) by mouth every 6 (six) hours as needed for pain (5 mg for mild to moderate paint (pain score 3-4) or 10 mg for moderate pain (pain score 5-6)).     senna-docusate (SENNOSIDES-DOCUSATE SODIUM) 8.6-50 mg tablet Take 1 tablet by mouth 2 (two) times a day.     WARFARIN SODIUM (WARFARIN ORAL) Take by mouth. Adjust dose based on INR results as directed.       Allergies:   Other environmental allergy    Review of Systems:   Review of Systems:   General: Night Sweats, Weight Gain  Eyes: Visual Distubance  Ears/Nose/Throat: WNL  Lungs: WNL  Heart: Chest Pain, Arm Pain, Shortness of Breath with activity  Stomach: WNL  Bladder: Frequent Urination at Night  Muscle/Joints: WNL  Skin: Poor Wound Healing  Nervous System: WNL  Mental Health: Depression     Blood: Easy Bleeding, Easy Bruising       Objective:      Physical Exam  @LASTENCWT:3@  5' 3\" (1.6 m)  @BMI:3@  BP 90/56 (Patient Site: Right Arm, " "Patient Position: Sitting, Cuff Size: Adult Regular)  Pulse 76  Ht 5' 3\" (1.6 m)  Wt 178 lb (80.7 kg)  LMP  (LMP Unknown)  SpO2 96%  BMI 31.53 kg/m2    General Appearance:   Alert, cooperative and in no acute distress.   HEENT:  No scleral icterus; the mucous membranes were pink and moist.   Neck: JVP flat. No thyromegaly. No HJR   Chest: The spine was straight. The chest was symmetric.   Lungs:   Respirations unlabored; the lungs are diffusely diminished.   Cardiovascular:   S1 and S2 normal and without murmur. No clicks or rubs. No carotid bruits noted. Right DP, PT, and radial pulses 2+. Left DP, PT, and radial pulses 2+.   Abdomen:  No organomegaly, masses, bruits, or tenderness. Bowels sounds are present   Extremities: No cyanosis, clubbing. Some right leg edema.   Skin: No xanthelasma.   Neurologic: Mood and affect are appropriate.         Lab Review   Lab Results   Component Value Date     08/16/2017     08/15/2017     08/14/2017    K 3.9 08/16/2017    K 4.2 08/15/2017    K 3.8 08/14/2017     08/16/2017     08/15/2017     08/14/2017    CO2 26 08/16/2017    CO2 28 08/15/2017    CO2 29 08/14/2017    BUN 14 08/16/2017    BUN 16 08/15/2017    BUN 15 08/14/2017    CREATININE 0.73 08/16/2017    CREATININE 0.78 08/15/2017    CREATININE 0.78 08/14/2017    CALCIUM 8.1 (L) 08/16/2017    CALCIUM 8.3 (L) 08/15/2017    CALCIUM 8.4 (L) 08/14/2017     Lab Results   Component Value Date    WBC 9.8 08/15/2017    WBC 13.1 (H) 08/08/2017    WBC 14.4 (H) 08/07/2017    HGB 11.2 (L) 08/15/2017    HGB 11.4 (L) 08/13/2017    HGB 11.0 (L) 08/11/2017    HCT 34.8 (L) 08/15/2017    HCT 30.6 (L) 08/08/2017    HCT 29.9 (L) 08/07/2017    MCV 95 08/15/2017    MCV 94 08/08/2017    MCV 93 08/07/2017     08/15/2017     08/13/2017     08/11/2017     Lab Results   Component Value Date    CHOL 145 03/29/2017    CHOL 93 03/23/2016    CHOL 285 (H) 01/09/2015    TRIG 95 03/29/2017    " TRIG 83 03/23/2016    TRIG 260 (H) 01/09/2015    HDL 47 (L) 03/29/2017    HDL 57 03/23/2016    HDL 68 01/09/2015    LDLDIRECT 125 07/10/2012    LDLDIRECT 87 06/14/2011    LDLDIRECT 68 05/12/2010     Lab Results   Component Value Date    BNP 2134 (H) 07/24/2017    BNP 1132 (H) 01/24/2010    BNP 1207 (H) 01/21/2010         Genoveva Flores M.D.

## 2021-06-12 NOTE — PROGRESS NOTES
VASCULAR SURGERY CLINIC CONSULTATION    VASCULAR SURGEON: Hilary Nieves MD    LOCATION:  [facility]    Bety Escobar   Medical Record #:  [id number]  YOB: 1950  Age:  66 y.o.     Date of Service: 7/19/2017    PRIMARY CARE PROVIDER: Janneth Arteaga MD      Reason for visit: Right hip pain    IMPRESSION:  1.  Right hip pain likely not vascular in etiology most probably musculoskeletal/radiculopathy.  2.  Possible impending left SFA stent occlusion due to high-grade stenosis.     RECOMMENDATION:  1.  Trial of oral Toradol for hip pain.  Refer to PMR for more thorough evaluation.  2.  Refer to IR for lower extremity angiogram and possible treatment of in-stent stenosis.  Also look at the left limb for potential of embolic events to foot. 3. Return visit after angiointervention with RONNY and toe pressures.      HPI:  Bety Escobar is a 66 y.o. female peripheral vascular disease who was seen today in consultation for new onset right hip pain.  Her pain started yesterday has been episodic with 3 severe episodes so far last about an hour each she has taken Tylenol with codeine with some relief.  The pain is there even at rest it is not brought on by activity.  This is the first occurrence of this type of pain for her.  There is a shooting quality to the pain that radiates through the anterior thigh and down the leg.    Her history significant for peripheral vascular disease for which she has SFA stents.  She has been doing well clinically from her PAD without recent claudication.  2 weeks ago she underwent hospitalization for unstable angina and heart failure.  She underwent coronary angioplasty and stenting.  On June 29 she had CTA which showed in-line flow all the way down both legs, but possible critical left SFA stent stenosis.  She is on a statin and aspirin.  She quit smoking in 2010.    No new chest pains or shortness of breath.  No new fevers or chills.    PHH:    Past Medical  History:   Diagnosis Date     Acute non-ST elevation myocardial infarction (NSTEMI) 7/8/2017     Acute non-ST elevation myocardial infarction (NSTEMI) 7/8/2017     Acute non-ST elevation myocardial infarction (NSTEMI) 7/8/2017     Cardiomyopathy 01/18/2010    EF 18% per Dr. Kebede with apparent improvement after PCI and medications     Coronary artery disease due to lipid rich plaque 07/07/2000    100% RCA with left to right collaterals since 1999, has had stents to LAD (twice), LCX (twice) and ramus, all by Dr. Kebede at [facility]     Diabetes mellitus type 2 in obese 1998    started on insulin in 2003     Dyslipidemia, goal LDL below 70 1999     Peripheral Vascular Disease 6/21/2007    Aguilar Pope: s/p lower extremity stenting         Past Surgical History:   Procedure Laterality Date     ANGIOPLASTY / STENTING FEMORAL Right 06/03/2008     ANGIOPLASTY / STENTING FEMORAL Left 06/21/2007     CARDIAC CATHETERIZATION N/A 7/7/2017    Procedure: Coronary Angiogram;  Surgeon: Manjeet Sepulveda MD;  Location: [facility] Cath Lab;  Service:      CORONARY STENT PLACEMENT  1999    LAD and LCX     CORONARY STENT PLACEMENT  01/18/2010    distal LCX by Dr. Kebede at [facility]     CORONARY STENT PLACEMENT  02/16/2010    mid LAD by Dr. Kebede at [facility]     CORONARY STENT PLACEMENT  06/14/2011    ramus by Dr. Kebede at [facility]; chronic 100% RCA known since 1999     CORONARY STENT PLACEMENT  07/07/2017    LUCA 3.5 x 20 to prox mid LAD and LUCA to DG by Dr. Sepulveda     ILIAC ARTERY STENT Left 03/25/2009     CO KNEE SCOPE,DIAGNOSTIC      Description: Arthroscopy Knee;  Proc Date: 02/13/2004;  Comments: Arthroscopy of the left knee with partial media mesisectomy due to meniscus tear.  Dr. Alex Robles.     TONSILLECTOMY AND ADENOIDECTOMY       TUBAL LIGATION         ALLERGIES:  Other environmental allergy    MEDS:  No current facility-administered medications for this visit.   No current outpatient  prescriptions on file.    Facility-Administered Medications Ordered in Other Visits:      acetaminophen suppository 650 mg (TYLENOL), 650 mg, Rectal, Q4H PRN, Jonatan Smith MD     acetaminophen tablet 650 mg (TYLENOL), 650 mg, Oral, Q4H PRN, Jonatan Smith MD     aluminum-magnesium hydroxide-simethicone 200-200-20 mg/5 mL suspension 30 mL (MAALOX ADVANCED), 30 mL, Oral, Q4H PRN, Jonatan Smith MD     dextrose 50 % (D50W) syringe 20-50 mL, 20-50 mL, Intravenous, PRN, Jonatan Smith MD     furosemide injection 40 mg (LASIX), 40 mg, Intravenous, BID - diuretic, Jonatan Smith MD, 40 mg at 07/24/17 2224     glucagon (human recombinant) injection 1 mg, 1 mg, Subcutaneous, PRN, Jonatan Smith MD     heparin (PF) subcutaneous injection 5,000 Units, 5,000 Units, Subcutaneous, Q12H 09-21, Jonatan Smith MD, 5,000 Units at 07/24/17 2224     hydrALAZINE tablet 10 mg (APRESOLINE), 10 mg, Oral, Q6H PRN, Jonatan Smith MD     insulin aspart injection (NovoLOG), , Subcutaneous, TID with meals, Jonatan Smith MD     insulin aspart injection (NovoLOG), , Subcutaneous, QHS, Jonatan Smith MD     ipratropium-albuterol 0.5-2.5 mg/3 mL nebulizer solution 3 mL (DUO-NEB), 3 mL, Nebulization, QID - RT **AND** Nebulizer treatment intermittent, , , QID - RT, Jonatan Smith MD     magnesium hydroxide suspension 30 mL (MILK OF MAG), 30 mL, Oral, Daily PRN, Jonatan Smith MD     morphine injection 2 mg, 2 mg, Intravenous, Q6H PRN, Jonatan Smith MD     naloxone injection 0.2-0.4 mg (NARCAN), 0.2-0.4 mg, Intravenous, PRN **OR** naloxone injection 0.2-0.4 mg (NARCAN), 0.2-0.4 mg, Intramuscular, PRN, Verónica Tena DO     nitroglycerin 2 % ointment 1 inch (NITRO-BID), 1 inch, Topical, Q6H PRN, Jonatan Smith MD     nitroglycerin SL tablet 0.4 mg (NITROSTAT), 0.4 mg, Sublingual, Q5 Min PRN, Verónica Tena DO, 0.4 mg at 07/24/17 1450     potassium chloride SA CR tablet 40 mEq (K-DUR,KLOR-CON), 40 mEq, Oral, Once, Erica Dietz,  "MD    SOCIAL HABITS:    History   Smoking Status     Former Smoker     Packs/day: 1.00     Years: 33.00     Types: Cigarettes     Start date: 1/1/1977     Quit date: 1/25/2010   Smokeless Tobacco     Never Used       History   Alcohol Use No       History   Drug Use No       FAMILY HISTORY:    Family History   Problem Relation Age of Onset     Alzheimer's disease Mother      Amputation of Leg Below Knee Mother      Prostate cancer Father      No Medical Problems Daughter      No Medical Problems Son      No Medical Problems Daughter      Breast cancer Neg Hx        REVIEW OF SYSTEMS:    A 12 point ROS was reviewed and except for what is listed in the HPI above, all others are negative    PE:  BP 90/60 (Patient Site: Left Arm, Patient Position: Sitting, Cuff Size: Adult Regular)  Pulse 60  Temp 98  F (36.7  C)  Resp 16  Ht 5' 2\" (1.575 m)  Wt 185 lb (83.9 kg)  LMP  (LMP Unknown)  Breastfeeding? No  BMI 33.84 kg/m2  Wt Readings from Last 1 Encounters:   07/25/17 189 lb 4.8 oz (85.9 kg)     Body mass index is 33.84 kg/(m^2).    EXAM:  GENERAL: This is a well-developed 66 y.o. female who appears her stated age  EYES: Grossly normal.  MOUTH: Buccal mucosa normal   MUSCULOSKELETAL: Grossly normal and both lower extremities are intact.  HEME/LYMPH: No lymphedema  NEUROLOGIC: Focally intact, Alert and oriented x 3.   PSYCH: appropriate affect  INTEGUMENT: No open lesions or ulcers  Pulse Exam:     DP: Left 0   Right  0     PT:   Left 0   Right  0          DIAGNOSTIC STUDIES:     Images:  Xr Chest Pa And Lateral    Result Date: 7/24/2017  XR CHEST PA AND LATERAL 7/24/2017 3:02 PM INDICATION: Shortness of breath, chest pain COMPARISON: 6/14/2011 FINDINGS: There is poor inspiration with crowding the pulmonary vascular markings and mild opacities in the lung bases may represent atelectasis versus consolidation. The pulmonary vasculature, cardiac silhouette, and pleural spaces appear normal for the  degree of " inspiration.    Cta Abdominal Aorta Iliofemoral Runoff    Result Date: 6/29/2017  [facility] 6/29/2017 EXAM: CTA ABDOMEN, PELVIS, AND BILATERAL LOWER EXTREMITY RUNOFF INDICATION: Peripheral artery disease. History of iliac and SFA stents. Intermittent claudication. Assess for embolic source. TECHNIQUE: Helical acquisition through the abdomen, pelvis, and bilateral lower extremities was performed during the arterial phase of contrast enhancement following the administration of intravenous contrast. 2D and 3D reconstructions performed by the CT technologist. Dose reduction techniques were used. COMPARISON: No pertinent comparison study is available for review. CONTRAST: 100 cc Omnipaque 350 LUNG BASES: Negative. CTA ABDOMEN AND PELVIS: Moderate to severe celiac origin and SMA origin stenoses. Mild right renal artery origin stenosis. Severe left renal artery origin stenosis. Concentric, calcified narrowing of the infrarenal aorta which measures 10 mm distally. Calcified moderately stenotic left common iliac artery. Moderate proximal right common iliac artery stenosis. Severe origin stenosis right internal iliac artery. Severe diffuse disease left internal iliac artery. Widely patent proximal left external iliac artery stent. The external iliac arteries are mildly and diffusely disease without significant stenosis. CTA RIGHT LEG: Mild CFA stenosis. Widely patent PFA. Long segment right SFA stents which are patent. Flow lumen is difficult to visualize due to extensive metal artifact. There is a least mild in-stent stenosis. The distal SFA and popliteal artery are widely patent. There is two-vessel runoff via the anterior tibial and peroneal arteries. The posterior tibial artery is occluded proximally and reconstituted distally. CTA LEFT LEG: Bulky calcified plaque and mild-moderate left CFA stenosis. The PFA is patent. SFA is patent. Mid SFA stents with evidence of severe in-stent stenosis. Moderately disease  patent distal SFA. The popliteal artery is patent. Appears to be three-vessel runoff. ABDOMEN AND PELVIS: The liver, spleen, gallbladder, pancreas and adrenal glands are unremarkable. Normal right kidney. Cortical scarring mid pole left kidney. No adenopathy. Normal urinary bladder.     1.  Moderate to severe celiac and SMA origin stenoses. 2.  Severe left renal artery origin stenosis. 3.  Concentric calcified narrowing of the distal abdominal aorta. 4.  Moderate bilateral common iliac artery stenoses. Severe bilateral internal iliac artery stenosis. Widely patent left external iliac artery stent. 5.  Right leg: Long segment SFA stents which are patent. Flow lumen is not adequately visualized. The distal SFA and popliteal are patent and there is two-vessel runoff. 6.  Left leg: Bulky calcified plaque with mild-moderate stenosis of the CFA. Patent PFA. Patent SFA with mid SFA stents with evidence of severe in-stent stenosis. Moderately diseased distal SFA with patent popliteal artery. There appears to be three-vessel runoff. Proximal trifurcation flow lumen difficult to assess due to venous contamination.    Cta Chest Pe Run    Result Date: 7/24/2017  CTA CHEST PE RUN 7/24/2017 4:11 PM INDICATION: Chest pain, acute, PE suspected TECHNIQUE: Helical acquisition through the chest was performed during the arterial phase of contrast enhancement using IV contrast. 2D and 3D reconstructions were performed by the CT technologist. Dose reduction techniques were used. IV CONTRAST: 90 ml omni 350 COMPARISON: None. FINDINGS: ANGIOGRAM CHEST: Negative for pulmonary emboli. Negative for thoracic aortic dissection and aneurysms. Densely calcified atherosclerotic plaque in the coronary arteries. LUNGS AND PLEURA: Moderate centrilobular edema. Small bilateral pleural effusions. Evaluation of the lungs is compromised by respiratory motion. There is a 3 mm nodule in the left upper lobe on series 6, image 30. There is a 3 mm subpleural  nodule in the  left lower lobe on series 6, image 71. Dependent opacities in the lungs likely represent atelectasis. MEDIASTINUM: The thyroid gland appears diffusely enlarged. No lymphadenopathy. LIMITED UPPER ABDOMEN: Densely calcified atherosclerotic plaque in the proximal abdominal aorta and the origin of its visualized branches. MUSCULOSKELETAL: Negative.     CONCLUSION: 1.  No evidence pulmonary embolus. 2.  Small bilateral pleural effusions. 3.  Small subpleural pulmonary nodules. Follow-up is recommended as per the Fleischner guidelines. 4.  Moderate to severe centrilobular emphysema. 5.  Densely calcified atherosclerotic plaque visualized arteries. 6.  The thyroid gland appears diffusely enlarged suggesting goiter. REFERENCE: Guidelines for Management of Incidental Pulmonary Nodules Detected on CT Images: From the Fleischner Society 2017. Guidelines apply to incidental nodules in patients who are 35 years or older. Guidelines do not apply to lung cancer screening, patients with immunosuppression, or patients with known primary cancer. MULTIPLE NODULES Nodule size less than or equal to 6 mm Low-risk patients: No follow-up needed. High-risk patients: Optional follow-up at 12 months. Nodule size 6 mm or larger Low-risk patients: Follow-up CT at 3-6 months, then consider CT at 18-24 months. High-risk patients: Follow-up CT at 3-6 months, then at 18-24 months if no change. -Use most suspicious nodule as guide to management. NOTE: ABNORMAL REPORT THE DICTATION ABOVE DESCRIBES AN ABNORMALITY FOR WHICH FOLLOW-UP IS NEEDED.     Us Venous Legs Bilateral    Result Date: 7/24/2017  US VENOUS LEGS BILATERAL 7/24/2017 10:08 PM INDICATION: for swelling in legs TECHNIQUE: Routine exam with compression, augmentation, and duplex utilizing 2D gray-scale imaging, Doppler interrogation with color-flow and spectral waveform analysis. COMPARISON: None. FINDINGS: The common femoral, femoral, popliteal, and segmentally visualized  calf veins were evaluated. Right leg veins are negative for deep venous thrombosis. Left leg veins are negative for deep venous thrombosis. No popliteal cysts.     CONCLUSION: 1.  Bilateral leg veins are negative for DVT.      I personally reviewed the images and my interpretation is no important luminal narrowing of the native lower extremity vasculature from the aorta down.  Left SFA stents require angiographic evaluation for possible critical stenosis.      LABS:      Sodium   Date Value Ref Range Status   07/25/2017 140 136 - 145 mmol/L Final   07/24/2017 142 136 - 145 mmol/L Final   07/10/2017 142 136 - 145 mmol/L Final     Potassium   Date Value Ref Range Status   07/25/2017 3.6 3.5 - 5.0 mmol/L Final   07/24/2017 3.9 3.5 - 5.0 mmol/L Final   07/10/2017 4.0 3.5 - 5.0 mmol/L Final     Chloride   Date Value Ref Range Status   07/25/2017 106 98 - 107 mmol/L Final   07/24/2017 109 (H) 98 - 107 mmol/L Final   07/10/2017 106 98 - 107 mmol/L Final     BUN   Date Value Ref Range Status   07/25/2017 20 8 - 22 mg/dL Final   07/24/2017 21 8 - 22 mg/dL Final   07/10/2017 21 8 - 22 mg/dL Final     Creatinine   Date Value Ref Range Status   07/25/2017 1.01 0.60 - 1.10 mg/dL Final   07/24/2017 0.85 0.60 - 1.10 mg/dL Final   07/10/2017 0.79 0.60 - 1.10 mg/dL Final     Hemoglobin   Date Value Ref Range Status   07/24/2017 10.8 (L) 12.0 - 16.0 g/dL Final   07/08/2017 12.3 12.0 - 16.0 g/dL Final   07/07/2017 11.7 (L) 12.0 - 16.0 g/dL Final     Platelets   Date Value Ref Range Status   07/24/2017 287 140 - 440 thou/uL Final   07/24/2017 344 140 - 440 thou/uL Final   07/08/2017 235 140 - 440 thou/uL Final     BNP   Date Value Ref Range Status   07/24/2017 2134 (H) 0 - 103 pg/mL Final   01/24/2010 1132 (H) <88 pg/mL Final     Comment:                    Likely moderate to severe CHF   01/21/2010 1207 (H) <88 pg/mL Final     Comment:                    Likely moderate to severe CHF     INR   Date Value Ref Range Status   06/16/2011  0.95 0.90 - 1.10 Final     Comment:                                                     INR Therapeutic Ranges                                                  Mech. Valve 2.5-3.5                                                  Post surg.  2.0-3.0                                                  DVT/PE      2.0-3.0   02/16/2010 1.03 0.90 - 1.10 Final     Comment:                                                     INR Therapeutic Ranges                                                  Mech. Valve 2.5-3.5                                                  Post surg.  2.0-3.0                                                  DVT/PE      2.0-3.0   01/18/2010 1.07 0.90 - 1.10 Final     Comment:                                                     INR Therapeutic Ranges                                                  Mech. Valve 2.5-3.5                                                  Post surg.  2.0-3.0                                                  DVT/PE      2.0-3.0     Hilary Nieves MD  VASCULAR SURGERY

## 2021-06-13 NOTE — PROGRESS NOTES
ASSESSMENT/PLAN:    Problem List Items Addressed This Visit     Type 2 diabetes mellitus with diabetic peripheral angiopathy without gangrene, with long-term current use of insulin (Chronic)    Relevant Orders    Glycosylated Hemoglobin A1c (Completed)    Basic Metabolic Panel (Completed)    Ischemic cardiomyopathy - Primary (Chronic)     Clinically stable.  BNP is still high, but in general is decreasing. Checking labs, clarified meds.  Was out of Plavix.         Relevant Medications    lisinopril (PRINIVIL,ZESTRIL) 5 MG tablet    furosemide (LASIX) 40 MG tablet    clopidogrel (PLAVIX) 75 mg tablet    Acute blood loss as cause of postoperative anemia    Relevant Orders    HM2(CBC w/o Differential) (Completed)      Other Visit Diagnoses     Shortness of breath        Relevant Medications    albuterol (PROAIR HFA;PROVENTIL HFA;VENTOLIN HFA) 90 mcg/actuation inhaler    Other Relevant Orders    BNP(B-type Natriuretic Peptide) (Completed)    Electrolyte abnormality        Abnormal sodium and others during the hospitalization, checked BMP today.  All have normalized.    Relevant Orders    Basic Metabolic Panel (Completed)    Xerosis cutis        Discussed aggressive moisturization and may take Vistaril.           Patient Instructions   ECZEMA (adapted from [facility])    Eczema is chronic itchy, dry skin. It is common to have flare-ups and times when the skin is clear.   Signs of eczema include:     constantly dry skin     red, itchy patches of rash, often worse during winter     may become raw and moist if scratched     in young children it is most common in the elbow creases, wrists and knees; sometimes on neck, ankles, feet, and other areas of the body      older children and adults may have it on their hands, feet, and eyelids     sometimes it starts on the cheeks in infants     thickening or changes in skin color can occur after a period of time    What causes eczema?  The cause of  eczema is unknown, but it may run in families. Many people with eczema also have or may develop allergies. Flareups of eczema can occur if:     skin touches an irritating substance such as soap or chlorine,     skin is exposed to water for too long without a moisturizer follow-up     there is a change in humidity or a change in season    How should I care for my skin or my child's skin?  Keep the skin moist  1. Follow your doctor s instructions about bathing. Most children and adults benefit from less frequent bathing or showering.  2. Use non-deodorant, unscented mild soap such as Cetpaphil cleanser or unscented Dove soap. Use soap only on areas of the body that get dirty such as face, hands, feet, and bottom and only at the end of the bath, to avoid soaking in soapy water. Avoid harsh soaps. No bubble baths. Keep shampoo off the eczema areas.  3. Pat skin dry with a towel, leaving the skin moist.  4. To help seal in the moisture, apply a thick unscented moisturizer (such as Cerave, Eucerin cream, Moisturel, Aveeno Eczema Therapy, Cetaphil, Vanicream, Curel Ultra ) to the whole body right after the bath (within 3 minutes) and several times a day.  5. If your skin does not improve with frequent use of lotions, you may need to see your health care provider for a prescription cream.  6. If a cream is prescribed for flare-ups, apply it before the moisturizer. Most prescribed creams can only be used twice a day.    Prevent and control itching  Avoid these things:     too much heat     dry air (use a humidifier if needed)     chlorine (such as in swimming pools or hot tubs)  Wear cotton next to the skin and avoid scratchy wool clothing. New clothes may be less irritating if they are washed before being worn.  Keep  fingernails short and clean to limit scratching and prevent infection.  Put cotton socks or mittens on your or your child s hands to prevent scratching during sleep.       Orders Placed This Encounter    Procedures     Glycosylated Hemoglobin A1c     Basic Metabolic Panel     HM2(CBC w/o Differential)     BNP(B-type Natriuretic Peptide)     Medications Discontinued During This Encounter   Medication Reason     OLANZapine zydis (ZYPREXA) 5 MG disintegrating tablet      OLANZapine zydis (ZYPREXA) 5 MG disintegrating tablet      lisinopril (PRINIVIL,ZESTRIL) 5 MG tablet Reorder     furosemide (LASIX) 40 MG tablet Reorder     clopidogrel (PLAVIX) 75 mg tablet Reorder         DATA REVIEWED:  Additional History from Old Records Summarized (2): 2  Decision to Obtain Records (1): 1  Radiology Tests Summarized or Ordered (1): 1  Labs Reviewed or Ordered (1): 1  Medicine Test Summarized or Ordered (1): 0  Independent Review of EKG or X-RAY(2 each): 0    CHIEF COMPLAINT:  Chief Complaint   Patient presents with     Hospital Visit Follow Up           HISTORY OF PRESENT ILLNESS:  This is a 66-year-old woman with long-standing cardiovascular disease and diabetes who presents for hospital follow-up.  Records were reviewed today she was admitted from 7/24/17-8/16/17 for chest pain.  Her hospitalization was quite complicated.  She underwent CABG 8/4/17, right femoral artery thrombectomy and angioplasty 8/6/17.  She had congestive heart failure with volume overload.  She had respiratory failure that was felt to be due to emphysema.  She had a questionable sinus node dysfunction.  She had right calf cellulitis which required antibiotics.  She had delirium following heart surgery.  Her last echocardiogram prior to hospital discharge showed an ejection fraction of 35% on 8/14/17.  Last chest x-ray was 8/11/17 did not show any signs of volume overload at that time.  She had hyponatremia that was improving prior to discharge and leukocytosis that normalized.  Blood sugars were checked and were controlled pretty well they did check an A1c while in the hospital and it was 6.7.  Of note, there is also concern for sleep apnea.    She was  discharged from the hospital to transitional care [facility]).  She ended up having wound infections at the site of the right groin and sternal surgical incisions which required quite a bit of care, including debridement and wound VAC therapy.  She was discharged home about 2 weeks ago.    She is a bit vague as to how she is doing.  Her biggest concerns are that her skin is really dry and that she is not sure about all of her medications.  Her skin had been a bit sensitive in the past, but has really gotten bad when she was at transitional care.  Skin is very dry and itchy.  She is not putting anything on it.    She does note that she has some left-sided chest pain but it is with movement and is more of a pulling sensation.  It is not like the chest pain she had with her coronary artery disease in the past.  She has been noted at transitional care and cardiology to have generally pretty low blood pressures except one time was high due to stress.  She has a sensation of heaviness in her feet but they are not really generally swollen except for on occasion.    When going over the medications, it does not appear that she has olanzapine with her and she is not sure whether she supposed to be taking it or if so, why.    She has diabetes and is taking NovoLog and Lantus insulin.  Confirms taking it without any problem.  Denies recurrence of hypoglycemia, which have been a problem for her in the past.    She has long-standing history of depression and feels like this is generally pretty stable  In spite of recent stressors.  Does not want to see anyone about changing dosages.      REVIEW OF SYSTEMS:   All other systems are negative.    PFSH:  Lives alone, does not smoke.    MEDICATIONS:  Current Outpatient Prescriptions   Medication Sig Dispense Refill     aspirin 81 MG EC tablet Take 81 mg by mouth daily.        atorvastatin (LIPITOR) 80 MG tablet Take 80 mg by mouth at bedtime.        blood glucose control,  "normal Soln Use As Directed 3 (three) times a day. TRUEtest Control Level 1 In Vitro Liquid       blood glucose test strips Use 1 each As Directed 4 (four) times a day before meals and at bedtime. Dispense brand per patient's insurance at pharmacy discretion. 200 strip 11     blood-glucose meter Misc Use glucometer to test blood sugar 4 times per day. 1 each 0     buPROPion (WELLBUTRIN SR) 150 MG 12 hr tablet Take 150 mg by mouth 2 (two) times a day.        carvedilol (COREG) 3.125 MG tablet TAKE 1 TABLET(3.125 MG) BY MOUTH TWICE DAILY WITH MEALS 60 tablet 0     DULoxetine (CYMBALTA) 60 MG capsule Take 60 mg by mouth daily.        LANCETS MISC Use As Directed 3 (three) times a day.       LANCING DEVICE MISC Use As Directed 3 (three) times a day.       NEEDLES, INSULIN DISPOSABLE (BD INSULIN PEN NEEDLE UF MINI MISC) Use As Directed. 31 G x 5 MM       acetaminophen (TYLENOL) 325 MG tablet Take 2 tablets (650 mg total) by mouth every 4 (four) hours as needed.  0     BD INSULIN PEN NEEDLE UF SHORT 31 gauge x 5/16\" Ndle USE TO INJECT INSULIN THREE TIMES DAILY WITH MEALS 100 each 11     clopidogrel (PLAVIX) 75 mg tablet Take 75 mg by mouth daily.        furosemide (LASIX) 40 MG tablet Take one 40 mg tablet by mouth morning and evening for the next two days  Continue with on 40 mg tablet by mouth every morning thereafter 60 tablet 0     insulin aspart (NOVOLOG FLEXPEN) 100 unit/mL injection pen Inject 2 Units under the skin 3 (three) times a day before meals. 15 mL 0     insulin syringe-needle U-100 (BD INSULIN SYRINGE) 1 mL 25 x 5/8\" Syrg Use As Directed 4 (four) times a day.       LANTUS 100 unit/mL injection Inject 18 Units under the skin at bedtime. 10 mL PRN     lisinopril (PRINIVIL,ZESTRIL) 5 MG tablet Take 1 tablet (5 mg total) by mouth daily. 30 tablet 0     OLANZapine zydis (ZYPREXA) 5 MG disintegrating tablet Take 1 tablet (5 mg total) by mouth daily. 30 tablet 0     OLANZapine zydis (ZYPREXA) 5 MG disintegrating " "tablet TK 1 T PO ONCE D  0     oxyCODONE (ROXICODONE) 5 MG immediate release tablet Take 1-2 tablets (5-10 mg total) by mouth every 6 (six) hours as needed for pain (5 mg for mild to moderate paint (pain score 3-4) or 10 mg for moderate pain (pain score 5-6)). 30 tablet 0     senna-docusate (SENNOSIDES-DOCUSATE SODIUM) 8.6-50 mg tablet Take 1 tablet by mouth 2 (two) times a day.       WARFARIN SODIUM (WARFARIN ORAL) Take by mouth. Adjust dose based on INR results as directed.       Current Facility-Administered Medications   Medication Dose Route Frequency Provider Last Rate Last Dose     lidocaine 2 % jelly (XYLOCAINE)   Topical PRN Hilary Nieves MD           TOBACCO USE:  History   Smoking Status     Former Smoker     Packs/day: 1.00     Years: 33.00     Types: Cigarettes     Start date: 1/1/1977     Quit date: 1/25/2010   Smokeless Tobacco     Never Used       VITALS:  Vitals:    10/27/17 1131   BP: 92/62   Patient Site: Right Arm   Patient Position: Sitting   Cuff Size: Adult Regular   Pulse: 66   Resp: 12   Temp: 98.6  F (37  C)   TempSrc: Oral   Weight: 172 lb (78 kg)   Height: 5' 3\" (1.6 m)     Wt Readings from Last 3 Encounters:   10/27/17 172 lb (78 kg)   10/11/17 168 lb 6.4 oz (76.4 kg)   09/26/17 167 lb 11.2 oz (76.1 kg)       PHYSICAL EXAM:  Gen:  A&A, NAD, although appears mildly anxious  CV:  HRRR, 1/6 systolic murmur. Neck veins flat, no JVD.  Resp:  CTAB  Abd:  S&NT, no mass  Ext:  W&D, no edema  Skin: excoriated and very dry, flakey          Results for orders placed or performed in visit on 10/27/17   Glycosylated Hemoglobin A1c   Result Value Ref Range    Hemoglobin A1c 6.9 (H) 3.5 - 6.0 %   Basic Metabolic Panel   Result Value Ref Range    Sodium 139 136 - 145 mmol/L    Potassium 4.9 3.5 - 5.0 mmol/L    Chloride 104 98 - 107 mmol/L    CO2 28 22 - 31 mmol/L    Anion Gap, Calculation 7 5 - 18 mmol/L    Glucose 130 (H) 70 - 125 mg/dL    Calcium 9.3 8.5 - 10.5 mg/dL    BUN 15 8 - 22 mg/dL    " Creatinine 0.74 0.60 - 1.10 mg/dL    GFR MDRD Af Amer >60 >60 mL/min/1.73m2    GFR MDRD Non Af Amer >60 >60 mL/min/1.73m2   HM2(CBC w/o Differential)   Result Value Ref Range    WBC 7.0 4.0 - 11.0 thou/uL    RBC 3.74 (L) 3.80 - 5.40 mill/uL    Hemoglobin 11.5 (L) 12.0 - 16.0 g/dL    Hematocrit 35.5 35.0 - 47.0 %    MCV 95 80 - 100 fL    MCH 30.7 27.0 - 34.0 pg    MCHC 32.4 32.0 - 36.0 g/dL    RDW 11.7 11.0 - 14.5 %    Platelets 336 140 - 440 thou/uL    MPV 6.8 (L) 7.0 - 10.0 fL   BNP(B-type Natriuretic Peptide)   Result Value Ref Range    BNP 1032 (H) 0 - 103 pg/mL

## 2021-06-13 NOTE — PROGRESS NOTES
Presents for a wound check visit today  S/P CABG x 3 with LIMA-LAD, SVG-Diag, SVG-Ramus, EVH RLE on 08/04/2017  S/P embolectomy from right femoral 08/07/2017  Discharged to TCU on 08/16/2017 and present 08/29/2017 for postop CV surgery evaluation  Seen in clinic for postop evaluation on 08/29/2017 and had the following findings  Lower sternal incision open, right groin is open with a deep wound measuring 2 long x 2 wide x 0.5 cm deep is now healed and the left groin incision is now healed too  [employer] Wound Vac therapy to promote tissue granulation and wound healing, on sternal incision Mon-Wed-Fri till wound is healed. Wound vac therapy is ongoing and wound healing is progressing satisfactorily.  Follow up in two weeks, on 10/11/2017 for a sternal wound check

## 2021-06-14 NOTE — PROGRESS NOTES
Thank you for asking the [facility] team to see Bety Escobar .      Assessment/Plan:     Ischemic cardiomyopathy - EF improved to 35% in August. Unable to titrate low dose coreg or lisinopril due to hypotension. Given her complaints of fatigue with exertion and dizziness with exertion I am going to have her hold her carvedilol and stay on lisinopril for now. Will continue lasix. F/U with CHF NP in 2-4 weeks. BNP was improving but still elevated at the end of October. Renal function and electrolytes were stable.     CAD - s/p CABG. On clopidogrel, aspirin and statin.  Continue to rehab.     Sinus node dysfunction - seen in the hospital. Consider outpatient holter monitor. Stopping carvedilol as above.     PAD - seeing Dr. Nieves regularly. On Coumadin.      Left subclavian stenosis - BPs in left arm only. BP in left arm today was 110/66 mmHg, similar to what she gets on her wrist cuff at home.      F/U CHF NP, cardiac rehab, and me in 3 months.         Current History:   Bety Escobar is a 67 y.o. with a very complex recent history. She is a former smoker with diabetes, copd, and PAD who was recently admitted with dyspnea and found to have systolic heart failure, multivessel coronary disease including the left main, severe left subclavian stenosis. She underwent CABG with the KAMARI instead of the LIMA (KAMARI-LAD, SVG-ramus, SVG-diag). Post op she had an acute leg event of the right femoral artery and underwent endarterectomy and was started on coumadin. EF before discharge was 35% with anterior wall hypokinesis. She was noted to have sinus note dysfunction while in the hospital and was only on low dose coreg as a result.      Bety returns for f/u. She had a sternal wound and right groin wound infection treated with a wound vac that is healing well. She is at home and gets tired and weak with a flight of stairs or a big store. She denies chest pain or edema. No early satiety. No palpitations. She feels  like she is not progressing. Bety has not done cardiac rehab or the CHF clinic due to transportation issues and he daughter wonders about [employer] and social work help.      Past Medical History:     Past Medical History:   Diagnosis Date     Acute non-ST elevation myocardial infarction (NSTEMI) 7/8/2017     Acute non-ST elevation myocardial infarction (NSTEMI) 7/8/2017     Acute non-ST elevation myocardial infarction (NSTEMI) 7/8/2017     Cardiomyopathy 01/18/2010    EF 18% per Dr. Kebede with apparent improvement after PCI and medications     Coronary artery disease due to lipid rich plaque 07/07/2000    100% RCA with left to right collaterals since 1999, has had stents to LAD (twice), LCX (twice) and ramus, all by Dr. Kebede at [facility] due to multiple etiologies, acute, hyperactive      Diabetes mellitus type 2 in obese 1998    started on insulin in 2003     Dyslipidemia, goal LDL below 70 1999     Family history of myocardial infarction     mother, father, brother CABG     Peripheral Vascular Disease 6/21/2007    Aguilar Pope: s/p lower extremity stenting        Past Surgical History:     Past Surgical History:   Procedure Laterality Date     ANGIOPLASTY / STENTING FEMORAL Right 06/03/2008     ANGIOPLASTY / STENTING FEMORAL Left 06/21/2007     CARDIAC CATHETERIZATION N/A 7/7/2017    Procedure: Coronary Angiogram;  Surgeon: Manjeet Sepulveda MD;  Location: [facility] Cath Lab;  Service:      CARDIAC CATHETERIZATION N/A 7/28/2017    Procedure: Coronary Angiogram;  Surgeon: Genoveva Flores MD;  Location: [facility] Cath Lab;  Service:      CORONARY ARTERY BYPASS GRAFT N/A 8/4/2017    Procedure: CORONARY ARTERY BYPASS X 4 RIGHT LEG ENDOSCOPIC VEIN HARVEST RIGHT INTERNAL MAMMARY ARTERY ANESTHESIA,TRANSESOPHAGEAL,ECHOCARGIOGRAM, ACCESS OF THE RIGHT CFA WITH 5 Kinyarwanda SHEATH;  Surgeon: Vicente Wagoner MD;  Location: [facility] OR;  Service:      CORONARY STENT PLACEMENT  1999  "   LAD and LCX     CORONARY STENT PLACEMENT  01/18/2010    distal LCX by Dr. Kebede at [facility]     CORONARY STENT PLACEMENT  02/16/2010    mid LAD by Dr. Kebede at [facility]     CORONARY STENT PLACEMENT  06/14/2011    ramus by Dr. Kebede at [facility]; chronic 100% RCA known since 1999     CORONARY STENT PLACEMENT  07/07/2017    LUCA 3.5 x 20 to prox mid LAD and LUCA to DG by Dr. Sepulveda     FEMORAL ARTERY REPAIR Right 8/7/2017    Procedure: EXPLORE FEMORAL ARTERY;  Surgeon: Roni Archer MD;  Location: [facility] Main OR;  Service:      ILIAC ARTERY STENT Left 03/25/2009     [place] KNEE SCOPE,DIAGNOSTIC      Description: Arthroscopy Knee;  Proc Date: 02/13/2004;  Comments: Arthroscopy of the left knee with partial media mesisectomy due to meniscus tear.  Dr. Alex Robles.     TONSILLECTOMY AND ADENOIDECTOMY       TUBAL LIGATION         Family History:     Family History   Problem Relation Age of Onset     Alzheimer's disease Mother      Amputation of Leg Below Knee Mother      CABG Mother      Prostate cancer Father      CABG Father      No Medical Problems Daughter      No Medical Problems Son      No Medical Problems Daughter      CABG Brother      Breast cancer Neg Hx        Social History:    reports that she quit smoking about 7 years ago. Her smoking use included Cigarettes. She started smoking about 40 years ago. She has a 33.00 pack-year smoking history. She has never used smokeless tobacco. She reports that she does not drink alcohol or use illicit drugs.    Meds:     Current Outpatient Prescriptions   Medication Sig Note     albuterol (PROAIR HFA;PROVENTIL HFA;VENTOLIN HFA) 90 mcg/actuation inhaler Inhale 2 puffs every 6 (six) hours as needed for wheezing.      aspirin 81 MG EC tablet Take 81 mg by mouth daily.       atorvastatin (LIPITOR) 80 MG tablet Take 80 mg by mouth at bedtime.       BD INSULIN PEN NEEDLE UF SHORT 31 gauge x 5/16\" Ndle USE TO INJECT INSULIN THREE TIMES DAILY WITH MEALS      " "blood glucose control, normal Soln Use As Directed 3 (three) times a day. TRUEtest Control Level 1 In Vitro Liquid      blood glucose test strips Use 1 each As Directed 4 (four) times a day before meals and at bedtime. Dispense brand per patient's insurance at pharmacy discretion.      blood-glucose meter Misc Use glucometer to test blood sugar 4 times per day.      buPROPion (WELLBUTRIN SR) 150 MG 12 hr tablet Take 150 mg by mouth 2 (two) times a day.       carvedilol (COREG) 3.125 MG tablet TAKE 1 TABLET(3.125 MG) BY MOUTH TWICE DAILY WITH MEALS      carvedilol (COREG) 3.125 MG tablet TAKE 1 TABLET(3.125 MG) BY MOUTH TWICE DAILY WITH MEALS      clopidogrel (PLAVIX) 75 mg tablet Take 1 tablet (75 mg total) by mouth daily.      DULoxetine (CYMBALTA) 60 MG capsule Take 60 mg by mouth daily.       furosemide (LASIX) 40 MG tablet Take 1 tablet (40 mg total) by mouth daily.      hydrOXYzine (ATARAX) 25 MG tablet TAKE 1 TABLET(25 MG) BY MOUTH FOUR TIMES DAILY AS NEEDED FOR ITCHING      insulin aspart (NOVOLOG FLEXPEN) 100 unit/mL injection pen Inject 2 Units under the skin 3 (three) times a day before meals.      insulin syringe-needle U-100 (BD INSULIN SYRINGE) 1 mL 25 x 5/8\" Syrg Use As Directed 4 (four) times a day.      LANCETS MISC Use As Directed 3 (three) times a day.      LANCING DEVICE MISC Use As Directed 3 (three) times a day.      lisinopril (PRINIVIL,ZESTRIL) 5 MG tablet Take 1 tablet (5 mg total) by mouth daily.      NEEDLES, INSULIN DISPOSABLE (BD INSULIN PEN NEEDLE UF MINI MISC) Use As Directed. 31 G x 5 MM      acetaminophen (TYLENOL) 325 MG tablet Take 2 tablets (650 mg total) by mouth every 4 (four) hours as needed.      LANTUS 100 unit/mL injection Inject 18 Units under the skin at bedtime.      oxyCODONE (ROXICODONE) 5 MG immediate release tablet Take 1-2 tablets (5-10 mg total) by mouth every 6 (six) hours as needed for pain (5 mg for mild to moderate paint (pain score 3-4) or 10 mg for moderate " "pain (pain score 5-6)).      senna-docusate (SENNOSIDES-DOCUSATE SODIUM) 8.6-50 mg tablet Take 1 tablet by mouth 2 (two) times a day.      WARFARIN SODIUM (WARFARIN ORAL) Take by mouth. Adjust dose based on INR results as directed. 11/27/2017: No longer taking       Allergies:   Other environmental allergy    Review of Systems:   Review of Systems:   General: Weight Loss  Eyes: WNL  Ears/Nose/Throat: Nosebleeds  Lungs: Shortness of Breath  Heart: Leg Swelling     Bladder: Frequent Urination at Night  Muscle/Joints: Joint Pain  Skin: Rash  Nervous System: Dizziness, Daytime Sleepiness, Loss of Balance  Mental Health: Depression     Blood: Easy Bruising       Objective:      Physical Exam  @LASTENCWT:3@  5' 3\" (1.6 m)  @BMI:3@  BP (!) 80/68 (Patient Site: Left Arm, Patient Position: Sitting, Cuff Size: Adult Regular)  Pulse 91  Resp 16  Ht 5' 3\" (1.6 m)  Wt 166 lb (75.3 kg)  LMP  (LMP Unknown)  BMI 29.41 kg/m2    General Appearance:   Alert, cooperative and in no acute distress.   HEENT:  No scleral icterus; the mucous membranes were pink and moist.   Neck: JVP flat. No thyromegaly. No HJR   Chest: The spine was straight. The chest was symmetric.   Lungs:   Respirations unlabored; the lungs are clear to auscultation.   Cardiovascular:   S1 and S2 normal and without murmur. No clicks or rubs. No carotid bruits noted. Right DP, PT, and radial pulses 2+. Left DP, PT, and radial pulses 2+. No open sternal wound.   Abdomen:  No organomegaly, masses, bruits, or tenderness. Bowels sounds are present   Extremities: No cyanosis, clubbing, or edema. Very small area of right groin wound exposure without erythema or drainage.   Skin: No xanthelasma.   Neurologic: Mood and affect are appropriate.         Lab Review   Lab Results   Component Value Date     10/27/2017     (L) 08/25/2017     08/16/2017    K 4.9 10/27/2017    K 4.4 08/25/2017    K 3.9 08/16/2017     10/27/2017    CL 98 08/25/2017    CL " 106 08/16/2017    CO2 28 10/27/2017    CO2 26 08/25/2017    CO2 26 08/16/2017    BUN 15 10/27/2017    BUN 21 08/25/2017    BUN 14 08/16/2017    CREATININE 0.74 10/27/2017    CREATININE 1.08 08/25/2017    CREATININE 0.73 08/16/2017    CALCIUM 9.3 10/27/2017    CALCIUM 9.2 08/25/2017    CALCIUM 8.1 (L) 08/16/2017     Lab Results   Component Value Date    WBC 7.0 10/27/2017    WBC 7.0 08/30/2017    WBC 12.0 (H) 08/25/2017    HGB 11.5 (L) 10/27/2017    HGB 10.3 (L) 08/30/2017    HGB 9.3 (L) 08/25/2017    HCT 35.5 10/27/2017    HCT 32.2 (L) 08/30/2017    HCT 28.7 (L) 08/25/2017    MCV 95 10/27/2017    MCV 95 08/30/2017    MCV 95 08/25/2017     10/27/2017     08/30/2017     08/25/2017     Lab Results   Component Value Date    CHOL 145 03/29/2017    CHOL 93 03/23/2016    CHOL 285 (H) 01/09/2015    TRIG 95 03/29/2017    TRIG 83 03/23/2016    TRIG 260 (H) 01/09/2015    HDL 47 (L) 03/29/2017    HDL 57 03/23/2016    HDL 68 01/09/2015    LDLDIRECT 125 07/10/2012    LDLDIRECT 87 06/14/2011    LDLDIRECT 68 05/12/2010     Lab Results   Component Value Date    BNP 1032 (H) 10/27/2017    BNP 1622 (H) 08/25/2017    BNP 2134 (H) 07/24/2017         Genoveva Flores M.D.

## 2021-06-16 PROBLEM — D64.9 ANEMIA: Status: ACTIVE | Noted: 2018-01-01

## 2021-06-16 PROBLEM — R55 SYNCOPE: Status: ACTIVE | Noted: 2018-01-01

## 2021-06-16 PROBLEM — R42 DIZZINESS: Status: ACTIVE | Noted: 2018-01-01

## 2021-06-16 PROBLEM — M79.606 LEG PAIN: Status: ACTIVE | Noted: 2018-01-01

## 2021-06-16 PROBLEM — I50.22: Chronic | Status: ACTIVE | Noted: 2017-01-01

## 2021-06-16 PROBLEM — I77.1 STENOSIS OF LEFT SUBCLAVIAN ARTERY (H): Chronic | Status: ACTIVE | Noted: 2017-01-01

## 2021-06-16 PROBLEM — I25.5 ISCHEMIC CARDIOMYOPATHY: Chronic | Status: ACTIVE | Noted: 2017-01-01

## 2021-06-16 PROBLEM — Z51.5 HOSPICE CARE: Status: ACTIVE | Noted: 2018-01-01

## 2021-06-16 PROBLEM — I47.20 V-TACH (H): Status: ACTIVE | Noted: 2018-01-01

## 2021-06-17 NOTE — PROGRESS NOTES
ASSESSMENT/PLAN:    Problem List Items Addressed This Visit     Type 2 diabetes mellitus with diabetic peripheral angiopathy without gangrene, with long-term current use of insulin - Primary (Chronic)     Inadequate diabetes control, as noted in medication noncompliance problem. Homecare can help evaluate.  Daughter wonders about oral meds, but she is already on a lot of pills and she had been very successfully controlled when compliant in the past.  Will see how things go once homecare initiated.    I did send Lantus pen instead of vials today, hoping that will make insulin easier.         Relevant Orders    Glycosylated Hemoglobin A1c (Completed)    Basic Metabolic Panel (Completed)    Severe Recurrent Major Depression (Chronic)     Some of the patient's symptoms may be due to severe uncontrolled depression today.  She is on an unusual regimen of Cymbalta plus Effexor and may benefit from a switch.  At like to see how everything goes when her anemia, etc. are addressed and other causes ruled out.  Therefore, no meds changed today.           Anemia     Suspect iron deficiency from hemorrhoidal blood loss and possibly never got replaced after acute blood loss anemia last fall. Recommend starting OTC iron supplement Floradix. Daughter given info and will .         Relevant Orders    Ferritin    Morphology, Path Smear Review (MORP)    Leg pain     Multifactorial today with diabetic neuropathy, peripheral vascular dz, and now toenail injury.  She inquires about medical marijuana, which could be considered. I will discuss with our pharmacist regarding options, given her current medication regimen.         Relevant Medications    acetaminophen-codeine (TYLENOL #3) 300-30 mg per tablet    Other Relevant Orders    Vitamin B12 (Completed)    RESOLVED: Depressive disorder      Other Visit Diagnoses     Fatigue        Multifactorial (anemia, depression, chronic medical illness). Home PT/OT eval and treat.    Relevant  Orders    Hepatic Profile (Completed)    HM2(CBC w/o Differential) (Completed)    Thyroid Cascade (Completed)    Injury of toe on left foot, initial encounter        It seems like she may have elevated the base of left great toe enough to cause break in skin.  Area cleaned and dressed today.    Poor compliance with medication        Recent compliance unintentionally poor, possibly due to depression, fatigue, and possible anoxic brain injury during last cardiac bypass. Homecare to eval.         At least 40 minutes spent with the patient and her family, more than 50% in counseling and coordination as above particularly considering therapeutic options, advantages and limitations of home care, ongoing support, etc.      SUBJECTIVE:  Bety Escobar is a 67 y.o. female here with her daughter, Donna, and grand-daughter, Nichole for multiple concerns.    Overall, family feels like she is doing poorly since her heart surgery last fall.  She is forgetful and sleeping irregularly.  They are not sure if she is taking medications regularly, as there finding inconsistencies in what is in her pillbox and finding medicines sometimes left here there in the house.  She is having such severe leg pain at times that she is unable to get down the stairs and family has to help carry her.  Her diabetes does not seem to be well-controlled.  She is not checking her blood sugars frequently and when they checked recently, they were quite high in the 300s.    The patient's main concern for herself is her leg and foot pain.  This is been ongoing for years, worsening overall.  She knows that she was on gabapentin for it at one point but it did not help and made her sleepy.    Denies chest pain or shortness of breath.    Confirms that she is not really checking blood sugars anymore, as it is painful.  She has been checking quite regularly a year or 2 ago.  At that time, she had been having a lot of low blood sugars and that is not currently a  "problem.    Additionally, she somehow injured her left great toe a few days ago.  She thinks that may be of the toe got twisted under hammertoe and it has been bleeding off and on since then.  Is not really having any pain per se, as she has little feeling in her feet.    Of note, she has a nephew that lives with her in her upper level duplex in the nephew's family lives below.  The daughter present today is frustrated that they are not helping her much.    The following portions of the patient's history were reviewed and updated as appropriate: allergies, current medications and problem list  Current Outpatient Prescriptions on File Prior to Visit   Medication Sig Dispense Refill     acetaminophen (TYLENOL) 325 MG tablet Take 2 tablets (650 mg total) by mouth every 4 (four) hours as needed.  0     albuterol (PROAIR HFA;PROVENTIL HFA;VENTOLIN HFA) 90 mcg/actuation inhaler Inhale 2 puffs every 6 (six) hours as needed for wheezing. 18 g 11     aspirin 81 MG EC tablet Take 81 mg by mouth daily.        BD INSULIN PEN NEEDLE UF SHORT 31 gauge x 5/16\" Ndle USE TO INJECT INSULIN THREE TIMES DAILY WITH MEALS 100 each 11     blood glucose control, normal Soln Use As Directed 3 (three) times a day. TRUEtest Control Level 1 In Vitro Liquid       blood glucose test strips Use 1 test strip with your Verio meter to check blood sugar tid 300 strip 11     blood-glucose meter (ONETOUCH VERIO SYSTEM) Misc Use the Verio meter as directed to check blood sugar 1 each 0     buPROPion (WELLBUTRIN SR) 150 MG 12 hr tablet Take 1 tablet (150 mg total) by mouth 2 (two) times a day. 180 tablet 0     clopidogrel (PLAVIX) 75 mg tablet Take 1 tablet (75 mg total) by mouth daily. 30 tablet 11     hydrOXYzine HCl (ATARAX) 25 MG tablet TAKE 1 TABLET(25 MG) BY MOUTH FOUR TIMES DAILY AS NEEDED FOR ITCHING 60 tablet 5     insulin aspart (NOVOLOG FLEXPEN) 100 unit/mL injection pen Inject 2 Units under the skin 3 (three) times a day before meals. 15 mL 0 " "    insulin glargine (LANTUS; BASAGLAR) 100 unit/mL (3 mL) pen Inject 18 Units under the skin at bedtime. 1 Box 11     insulin syringe-needle U-100 (BD INSULIN SYRINGE) 1 mL 25 gauge x 5/8\" Syrg Use 1 Syringe As Directed 4 (four) times a day. 400 each 1     lancets 33 gauge Misc Use 1 each As Directed 3 (three) times a day. Use one lancet with your Verio meter to check blood sugar as directed 300 each 11     lisinopril (PRINIVIL,ZESTRIL) 5 MG tablet Take 1 tablet (5 mg total) by mouth daily. 30 tablet 11     NEEDLES, INSULIN DISPOSABLE (BD INSULIN PEN NEEDLE UF MINI MISC) Use As Directed. 31 G x 5 MM       NOVOLOG FLEXPEN U-100 INSULIN 100 unit/mL injection pen ADMINISTER 22 UNITS UNDER THE SKIN THREE TIMES DAILY BEFORE A MEAL 15 mL 0     atorvastatin (LIPITOR) 80 MG tablet Take 80 mg by mouth at bedtime.        DULoxetine (CYMBALTA) 60 MG capsule Take 1 capsule (60 mg total) by mouth daily. 90 capsule 0     furosemide (LASIX) 40 MG tablet Take 1 tablet (40 mg total) by mouth daily. 30 tablet 11     oxyCODONE (ROXICODONE) 5 MG immediate release tablet Take 1-2 tablets (5-10 mg total) by mouth every 6 (six) hours as needed for pain (5 mg for mild to moderate paint (pain score 3-4) or 10 mg for moderate pain (pain score 5-6)). 30 tablet 0     senna-docusate (SENNOSIDES-DOCUSATE SODIUM) 8.6-50 mg tablet Take 1 tablet by mouth 2 (two) times a day.       Current Facility-Administered Medications on File Prior to Visit   Medication Dose Route Frequency Provider Last Rate Last Dose     lidocaine 2 % jelly (XYLOCAINE)   Topical PRN Hilary Nieves MD            History   Smoking Status     Former Smoker     Packs/day: 1.00     Years: 33.00     Types: Cigarettes     Start date: 1/1/1977     Quit date: 1/25/2010   Smokeless Tobacco     Never Used       OBJECTIVE:  :  BP 98/60  Pulse 80  Temp 98.4  F (36.9  C) (Oral)   Resp 12  Ht 5' 3\" (1.6 m)  LMP  (LMP Unknown)    Gen:  A&A, NAD  Neck:  supple, no sig LAD or " thyromegally  CV:  HRRR, no M/R/G  Resp:  CTAB  Abd:  S&NT, no mass  Ext:  W&D, no edema.  On left great toe base of the toenail there is some break in the skin.  Initially the area is quite covered in dried blood but we had her soak her feet and gently scrubbed the area and revealed only a small area of bleeding.  Dressing and antibiotic ointment was applied.  There is very light erythema in this area after cleaning it but no warmth or induration.    Lab results:    Results for orders placed or performed in visit on 04/13/18   Glycosylated Hemoglobin A1c   Result Value Ref Range    Hemoglobin A1c 8.1 (H) 3.5 - 6.0 %   Basic Metabolic Panel   Result Value Ref Range    Sodium 135 (L) 136 - 145 mmol/L    Potassium 3.5 3.5 - 5.0 mmol/L    Chloride 98 98 - 107 mmol/L    CO2 27 22 - 31 mmol/L    Anion Gap, Calculation 10 5 - 18 mmol/L    Glucose 62 (L) 70 - 125 mg/dL    Calcium 8.2 (L) 8.5 - 10.5 mg/dL    BUN 23 (H) 8 - 22 mg/dL    Creatinine 0.89 0.60 - 1.10 mg/dL    GFR MDRD Af Amer >60 >60 mL/min/1.73m2    GFR MDRD Non Af Amer >60 >60 mL/min/1.73m2   Hepatic Profile   Result Value Ref Range    Bilirubin, Total 0.6 0.0 - 1.0 mg/dL    Bilirubin, Direct 0.3 <=0.5 mg/dL    Protein, Total 6.3 6.0 - 8.0 g/dL    Albumin 3.0 (L) 3.5 - 5.0 g/dL    Alkaline Phosphatase 164 (H) 45 - 120 U/L    AST 28 0 - 40 U/L     (H) 0 - 45 U/L   HM2(CBC w/o Differential)   Result Value Ref Range    WBC 9.6 4.0 - 11.0 thou/uL    RBC 3.88 3.80 - 5.40 mill/uL    Hemoglobin 8.8 (L) 12.0 - 16.0 g/dL    Hematocrit 27.4 (L) 35.0 - 47.0 %    MCV 70 (L) 80 - 100 fL    MCH 22.5 (L) 27.0 - 34.0 pg    MCHC 32.0 32.0 - 36.0 g/dL    RDW 15.8 (H) 11.0 - 14.5 %    Platelets 355 140 - 440 thou/uL    MPV 8.1 7.0 - 10.0 fL   Vitamin B12   Result Value Ref Range    Vitamin B-12 >2000 (H) 213 - 816 pg/mL   Thyroid Cascade   Result Value Ref Range    TSH 0.59 0.30 - 5.00 uIU/mL

## 2021-06-17 NOTE — PROGRESS NOTES
ASSESMENT AND PLAN:  Diagnoses and all orders for this visit:    1. Cellulitis of Left breast, inframammary fold   -  Area was also moist due to perspiration, excoriation marks and multiple  scabs.   Cleaned area and tx with topical antibiotics, clean gauze applied.  -  Encourage Pt to wear breathable clothing to allow good air circulation and prevent increased perspiration. Also avoid picking at skin.   -  Pt is to F/u on Monday.  Indications for urgent care discussed.     Ordered:  -     sulfamethoxazole-trimethoprim (BACTRIM DS) 800-160 mg per tablet; Take 1 tablet by mouth 2 (two) times a day for 10 days.  Dispense: 20 tablet; Refill: 0  -     amoxicillin-clavulanate (AUGMENTIN) 875-125 mg per tablet; Take 1 tablet by mouth 2 (two) times a day for 10 days.  Dispense: 20 tablet; Refill: 0    2. Cellulitis, Left big toe  -  Pt was seen a week ago with no indication of infection.  Today, toe is warm with erythema, no induration or discharge.   -  Old guaze removed and antibiotic ointment and new gauze placed on.    -  See above for tx plan.    Pt brought in by her daughter, Donna.    Reviewed Medical/Social history and Medications.  See new changes above.  Discussed indications for emergent medical attention and routine F/u.  Patient/Parent/Guardian understands and agrees with treatment plan.     SUBJECTIVE:  Bety Escobar is a 67 y.o. Female with Type 2 Diabetes Mellitus with diabetic peripheral angiopathy w/o gangrene, with long term current use of Insulin, PVD, Ishcemic cardiomyopathy, Depression who presents with Rash on Left breast x 5 days.    Pt noticed rash underneath her Left breast few days ago.  Appears rash  may have  been there longer since there were several healed scabs. Rash is not pruritic or painful.   Pt denies fever/chills, wheezing, SOB,  n/v.   Daughter states her mom tend to pick at her skin a lot.  According to Daughter, Pt is living with son and nephew who cares for Pt.  She states Pt  may not be taking her meds as needed.    Daughter also states that Pt has an infection of her Left toe; Pt is not on Abx and would like me to see if it is getting better.        Discuss if Pt would like care guide to help with appts,meds, and assist in getting resources for Pt.  Daughter states they have an appt with Care guide next week.    ROS:  Comprehensive Review of Systems Negative except stated in HPI.     Past Medical History:   Diagnosis Date     Acute non-ST elevation myocardial infarction (NSTEMI) 7/8/2017     Acute non-ST elevation myocardial infarction (NSTEMI) 7/8/2017     Acute non-ST elevation myocardial infarction (NSTEMI) 7/8/2017     Cardiomyopathy 01/18/2010    EF 18% per Dr. Kebede with apparent improvement after PCI and medications     Coronary artery disease due to lipid rich plaque 07/07/2000    100% RCA with left to right collaterals since 1999, has had stents to LAD (twice), LCX (twice) and ramus, all by Dr. Kebede at [facility] due to multiple etiologies, acute, hyperactive      Diabetes mellitus type 2 in obese 1998    started on insulin in 2003     Dyslipidemia, goal LDL below 70 1999     Family history of myocardial infarction     mother, father, brother CABG     Peripheral Vascular Disease 6/21/2007    [facility] Pope: s/p lower extremity stenting      Patient Active Problem List   Diagnosis     Peripheral Neuropathy     Dyslipidemia, goal LDL below 70     Osteoarthritis     Coronary artery disease due to lipid rich plaque     Type 2 diabetes mellitus with diabetic peripheral angiopathy without gangrene, with long-term current use of insulin     Peripheral Vascular Disease     Severe Recurrent Major Depression     Ischemic cardiomyopathy     Stenosis of left subclavian artery-Only check BP on right!     Emphysema of lung     Lung nodules     Sleep disorder breathing     Sinus node dysfunction     S/P CABG x 4     Anemia     Leg pain     Current Outpatient Prescriptions  "  Medication Sig Dispense Refill     acetaminophen (TYLENOL) 325 MG tablet Take 2 tablets (650 mg total) by mouth every 4 (four) hours as needed.  0     acetaminophen-codeine (TYLENOL #3) 300-30 mg per tablet Take 1-2 tablets by mouth at bedtime as needed for pain. 50 tablet 1     albuterol (PROAIR HFA;PROVENTIL HFA;VENTOLIN HFA) 90 mcg/actuation inhaler Inhale 2 puffs every 6 (six) hours as needed for wheezing. 18 g 11     aspirin 81 MG EC tablet Take 81 mg by mouth daily.        atorvastatin (LIPITOR) 80 MG tablet Take 1 tablet (80 mg total) by mouth daily. 90 tablet 4     BD INSULIN PEN NEEDLE UF SHORT 31 gauge x 5/16\" Ndle USE TO INJECT INSULIN THREE TIMES DAILY WITH MEALS 100 each 11     blood glucose test strips Use 1 test strip with your Verio meter to check blood sugar tid 300 strip 11     blood-glucose meter (ONETOUCH VERIO SYSTEM) Misc Use the Verio meter as directed to check blood sugar 1 each 0     buPROPion (WELLBUTRIN SR) 150 MG 12 hr tablet Take 1 tablet (150 mg total) by mouth 2 (two) times a day. 180 tablet 0     clopidogrel (PLAVIX) 75 mg tablet Take 1 tablet (75 mg total) by mouth daily. 30 tablet 11     DULoxetine (CYMBALTA) 60 MG capsule Take 1 capsule (60 mg total) by mouth daily. 90 capsule 0     furosemide (LASIX) 40 MG tablet Take 1 tablet (40 mg total) by mouth daily. 30 tablet 11     hydrOXYzine HCl (ATARAX) 25 MG tablet TAKE 1 TABLET(25 MG) BY MOUTH FOUR TIMES DAILY AS NEEDED FOR ITCHING 60 tablet 5     insulin glargine (LANTUS; BASAGLAR) 100 unit/mL (3 mL) pen Inject 18 Units under the skin at bedtime. 1 Box 11     insulin syringe-needle U-100 (BD INSULIN SYRINGE) 1 mL 25 gauge x 5/8\" Syrg Use 1 Syringe As Directed 4 (four) times a day. 400 each 1     lancets 33 gauge Misc Use 1 each As Directed 3 (three) times a day. Use one lancet with your Verio meter to check blood sugar as directed 300 each 11     lisinopril (PRINIVIL,ZESTRIL) 5 MG tablet Take 1 tablet (5 mg total) by mouth daily. " 30 tablet 11     NOVOLOG FLEXPEN U-100 INSULIN 100 unit/mL injection pen ADMINISTER 22 UNITS UNDER THE SKIN THREE TIMES DAILY BEFORE A MEAL 15 mL 0     senna-docusate (SENNOSIDES-DOCUSATE SODIUM) 8.6-50 mg tablet Take 1 tablet by mouth 2 (two) times a day.       Current Facility-Administered Medications   Medication Dose Route Frequency Provider Last Rate Last Dose     lidocaine 2 % jelly (XYLOCAINE)   Topical PRN Hilary Nieves MD         History   Smoking Status     Former Smoker     Packs/day: 1.00     Years: 33.00     Types: Cigarettes     Start date: 1/1/1977     Quit date: 1/25/2010   Smokeless Tobacco     Never Used     OBJECTIVE: /56 (Patient Site: Right Arm, Patient Position: Sitting, Cuff Size: Adult Regular)  Pulse 88  Temp 98.1  F (36.7  C) (Oral)   Wt 155 lb 4 oz (70.4 kg)  LMP  (LMP Unknown)  SpO2 100%  BMI 27.5 kg/m2   No results found for this or any previous visit (from the past 24 hour(s)).    PHYSICAL:  General Alert, awake, not in acute distress.   CV Normal S1 & S2. No murmurs.   RESP Non-labored, RRR, CTAB. No wheezes or crackles.    SKIN Left inframammary fold with erythema, edema, warmth with mild serous discharge.  Several healed scabs.  No abscess.    EXTREMITY Mild erythema and edema of Left big toe.  No discharge, drainage, or abscess.  Pulses present.    NEURO Grossly intact.  LE sensation and strength intact and equal.        Juanita Hyde PA-C

## 2021-06-17 NOTE — ANESTHESIA PROCEDURE NOTES
Emergent Intubation  Date/Time: 4/30/2018 6:15 PM    Performing CRNA: MARY WYLIEte: oral  Technique: direct  Laryngoscope size: Mac 3  Tube size: 7.5 mm  Tube type: cuffed  Cuff inflated: yes  Level of Difficulty: 0ETT to lip: 23 cm  Tube secured with: ETT isaac  ETCO2 = Yes  Breath sounds: equal    Sign out given. CXR and sedation per primary care team.

## 2021-06-25 NOTE — PROGRESS NOTES
Progress Notes by Mikel Bishop MD at 7/7/2017 11:20 AM     Author: Mikel Bishop MD Service: -- Author Type: Physician    Filed: 7/7/2017 12:14 PM Encounter Date: 7/7/2017 Status: Signed    : Mikel Bishop MD (Physician)           Click to link to [facility] Consultation Note    Thank you, Dr. Nieves, for asking Bety Escobar to meet with me in consultation today at the [facility] to evaluate cardiomyopathy with prior history of severe cardiomyopathy.     Assessment:    1. Unstable angina  ECG 12 lead with MUSE    Case Request Cath Lab: Coronary Angiogram, Percutaneous Coronary Intervention   2. Cardiomyopathy  ECG 12 lead with MUSE   3. Acute systolic heart failure     4. Coronary artery disease due to lipid rich plaque         Plan:    I recommend hospitalization today with urgent angiography as soon as possible given her unstable symptoms.  We will draw laboratories at the hospital.  I have notified the [facility] cardiologist, Dr. Basim Farr, and her staff is making arrangements so that I can speak with the hospitalist on duty.  As she is symptom-free at this time, her son-in-law may take her to [facility].    She will benefit from treatment for heart failure including titration of her very low-dose carvedilol, augmentation of diuretics, and use of heparin and nitroglycerin as directed by the hospital physicians.    Bety states she agrees with my advice to be admitted to the hospital today so that she can be monitored until she safely undergoes angiography.    An After Visit Summary was printed and given to the patient.    Current History:    Bety was asked to meet with me today at the rapid access clinic by Dr. Hilary Barth who saw her at the vascular clinic and ordered an echocardiogram.  This showed recurrence of a previous cardiomyopathic process.  Bety was not familiar with the  results.    She described that for a month or more she has had significant exertional chest pressure discomfort that stops her after 1 block of walking.  She is also more short of breath than in the past.  Last night she states she had to set up for several hours because she was coughing up phlegm.  She feels better this morning.  She has not had any resting chest discomfort.    Bety has had multiple prior coronary angiographic procedures with stenting performed twice in the left anterior descending, twice in the circumflex, and once in the ramus by Dr. Abraham Kebede.  Those [facility] which I reviewed today.  Unfortunately, Bety has a limited recollection of these procedures and thought the echocardiogram she had last week was an angiogram.  She cannot recall what symptoms precipitated the stenting procedures.  She has a chronically occluded right coronary artery dating back to at least 1999.  Her ejection fraction was as low as 18% in 2010 but was said to be 60% last year.  It is now 40% per Dr. Curry Darden's recent echocardiogram report.    Patient Active Problem List   Diagnosis   ? Anemia   ? Peripheral Neuropathy   ? Dyslipidemia, goal LDL below 70   ? Osteoarthritis   ? Coronary artery disease due to lipid rich plaque   ? Type 2 diabetes mellitus with circulatory disorder, with long-term current use of insulin   ? Peripheral Vascular Disease   ? Severe Recurrent Major Depression   ? Cardiomyopathy       Past Medical History:  Past Medical History:   Diagnosis Date   ? Cardiomyopathy 01/18/2010    EF 18% per Dr. Kebede with apparent improvement after PCI and medications   ? Coronary artery disease due to lipid rich plaque 07/07/2000    100% RCA with left to right collaterals since 1999, has had stents to LAD (twice), LCX (twice) and ramus, all by Dr. Kebede at [facility]   ? Diabetes mellitus type 2 in obese 1998    started on insulin in 2003   ? Dyslipidemia, goal LDL below 70 1999   ?  "Essential hypertension    ? PAD (peripheral artery disease)        Past Surgical History:  Past Surgical History:   Procedure Laterality Date   ? ANGIOPLASTY / STENTING FEMORAL Right 06/03/2008   ? ANGIOPLASTY / STENTING FEMORAL Left 06/21/2007   ? CORONARY STENT PLACEMENT  1999    LAD and LCX   ? CORONARY STENT PLACEMENT  01/18/2010    distal LCX by Dr. Kebede at [facility]   ? CORONARY STENT PLACEMENT  02/16/2010    mid LAD by Dr. Kebede at [facility]   ? CORONARY STENT PLACEMENT  06/14/2011    ramus by Dr. Kebede at [facility]; chronic 100% RCA known since 1999   ? ILIAC ARTERY STENT Left 03/25/2009   ? DC KNEE SCOPE,DIAGNOSTIC      Description: Arthroscopy Knee;  Proc Date: 02/13/2004;  Comments: Arthroscopy of the left knee with partial media mesisectomy due to meniscus tear.  Dr. Alex Robles.   ? TONSILLECTOMY AND ADENOIDECTOMY     ? TUBAL LIGATION         Family History:  Family History   Problem Relation Age of Onset   ? Alzheimer's disease Mother    ? Amputation of Leg Below Knee Mother    ? Prostate cancer Father    ? No Medical Problems Daughter    ? No Medical Problems Son    ? No Medical Problems Daughter    ? Breast cancer Neg Hx        Social History:   reports that she quit smoking about 7 years ago. Her smoking use included Cigarettes. She started smoking about 40 years ago. She has a 33.00 pack-year smoking history. She has never used smokeless tobacco. She reports that she does not drink alcohol or use illicit drugs.    Medications:  Outpatient Encounter Prescriptions as of 7/7/2017   Medication Sig Dispense Refill   ? acetaminophen-codeine (TYLENOL #3) 300-30 mg per tablet Take 1-2 tablets by mouth every 6 (six) hours as needed for pain. 30 tablet 2   ? aspirin 81 MG EC tablet Take 81 mg by mouth daily.     ? atorvastatin (LIPITOR) 80 MG tablet TAKE 1 TABLET BY MOUTH EVERY DAY 90 tablet 3   ? BD INSULIN PEN NEEDLE UF SHORT 31 gauge x 5/16\" Ndle USE TO INJECT INSULIN THREE TIMES DAILY " "WITH MEALS 100 each 11   ? blood glucose control, normal Soln Use As Directed 3 (three) times a day. TRUEtest Control Level 1 In Vitro Liquid     ? BLOOD SUGAR DIAGNOSTIC (ONETOUCH ULTRA TEST MISC) Use 1 strip As Directed 4 (four) times a day.     ? buPROPion (WELLBUTRIN SR) 150 MG 12 hr tablet TAKE 1 TABLET BY MOUTH TWICE DAILY 180 tablet 3   ? carvedilol (COREG) 3.125 MG tablet TAKE 1 TABLET BY MOUTH TWICE DAILY 180 tablet 3   ? clopidogrel (PLAVIX) 75 mg tablet Take 75 mg by mouth daily.     ? DULoxetine (CYMBALTA) 60 MG capsule TAKE 1 CAPSULE BY MOUTH DAILY 90 capsule 10   ? furosemide (LASIX) 20 MG tablet TAKE 1 TABLET BY MOUTH EVERY DAY 90 tablet 3   ? insulin syringe-needle U-100 (BD INSULIN SYRINGE) 1 mL 25 x 5/8\" Syrg Use As Directed 4 (four) times a day.     ? LANCETS MISC Use As Directed 3 (three) times a day.     ? LANCING DEVICE MISC Use As Directed 3 (three) times a day.     ? LANTUS 100 unit/mL injection ADMINISTER 45 UNITS UNDER THE SKIN AT BEDTIME 10 mL 11   ? lisinopril (PRINIVIL,ZESTRIL) 2.5 MG tablet TAKE 1 TABLET BY MOUTH EVERY DAY 90 tablet 3   ? NEEDLES, INSULIN DISPOSABLE (BD INSULIN PEN NEEDLE UF MINI MISC) Use As Directed. 31 G x 5 MM     ? NOVOLOG FLEXPEN 100 unit/mL injection pen ADMINISTER 22 UNITS UNDER THE SKIN THREE TIMES DAILY BEFORE A MEAL 15 mL 11   ? ONETOUCH ULTRA TEST strips TEST FOUR TIMES DAILY AS DIRECTED 350 strip 0     Facility-Administered Encounter Medications as of 7/7/2017   Medication Dose Route Frequency Provider Last Rate Last Dose   ? Study Drug Bococizumab 150 mg/Placebo injection 1 Syringe (SPIRE 1020)  1 Syringe Subcutaneous Q14 Days Shaniqua Crowell MD       ? Study Drug Bococizumab 150 mg/Placebo injection 1 Syringe (SPIRE 1020)  1 Syringe Subcutaneous Q14 Days Suyeon Velo, RN       ? Study Drug Bococizumab 150 mg/Placebo injection 6 Syringe (SPIRE 1020)  6 Syringe Subcutaneous Q14 Days Shaniqua Crowell MD       ? Study Drug Bococizumab 150 mg/Placebo " "injection 6 Syringe (SPIRE 1020)  6 Syringe Subcutaneous Q14 Days Suyeon Velo, RN       ? Study Drug Bococizumab 150 mg/Placebo injection 6 Syringe (SPIRE 1020)  6 Syringe Subcutaneous Q14 Days Suyeon Velo, RN           Allergies:  No known drug allergies    Review of Systems:     Her 12 system review is positive for hearing loss, cough productive of white sputum, shortness of breath with exertion, orthopnea, exertional chest tightness, nocturia, joint discomfort, muscle pain, daytime fatigue, easy bruising, and depression.  It is otherwise negative.    Objective:    Wt Readings from Last 5 Encounters:   07/07/17 192 lb (87.1 kg)   06/14/17 180 lb (81.6 kg)   05/26/17 180 lb (81.6 kg)   03/29/17 188 lb 8 oz (85.5 kg)   09/06/16 185 lb (83.9 kg)      5' 3\" (1.6 m)  Body mass index is 34.01 kg/(m^2).  BP 90/52 (Patient Site: Right Arm, Patient Position: Sitting, Cuff Size: Adult Regular)  Pulse 62  Resp 16  Ht 5' 3\" (1.6 m)  Wt 192 lb (87.1 kg)  LMP  (LMP Unknown)  BMI 34.01 kg/m2     Physical Exam:    General Appearance: Alert and not in distress   HEENT: No scleral icterus; the tongue is midline and the mucous membranes are pink and moist   Neck:  there is a soft right anterior cervical bruit, but I detected no adenopathy or thyromegaly; jugular venous pressure is difficult to evaluate due to obesity    Chest: The spine is straight and the chest is symmetric   Lungs: Respirations are unlabored; the lungs are clear to auscultation   Cardiovasular: Auscultation reveals normal first and second heart sounds with no murmurs, rubs, or gallops; the carotid and radial pulses are intact, but I could not detect femoral or pedal pulses    Abdomen: No organomegaly, masses, bruits, or tenderness; bowel sounds are present   Extremities: No cyanosis, clubbing or edema   Skin: No xanthelasma   Neurologic: Mood and affect are appropriate       Cardiac testing:    EKG: Sinus rhythm with evidence of prior septal infarction and " diffuse downsloping ST segment depression per my personal review.    Echocardiogram:      When compared to the previous study dated 5/4/2016, left ventricular ejection fraction is decreased.    Normal left ventricular size, mild left ventricular hypertrophy and global hypokinesis. Left ventricle ejection fraction is moderately decreased. The estimated left ventricular ejection fraction is 40%.    Normal right ventricular size and function.    No obvious valvular disease.    Imaging:    Cta Abdominal Aorta Iliofemoral Runoff    Result Date: 6/29/2017  [facility] 6/29/2017 EXAM: CTA ABDOMEN, PELVIS, AND BILATERAL LOWER EXTREMITY RUNOFF INDICATION: Peripheral artery disease. History of iliac and SFA stents. Intermittent claudication. Assess for embolic source. TECHNIQUE: Helical acquisition through the abdomen, pelvis, and bilateral lower extremities was performed during the arterial phase of contrast enhancement following the administration of intravenous contrast. 2D and 3D reconstructions performed by the CT technologist. Dose reduction techniques were used. COMPARISON: No pertinent comparison study is available for review. CONTRAST: 100 cc Omnipaque 350 LUNG BASES: Negative. CTA ABDOMEN AND PELVIS: Moderate to severe celiac origin and SMA origin stenoses. Mild right renal artery origin stenosis. Severe left renal artery origin stenosis. Concentric, calcified narrowing of the infrarenal aorta which measures 10 mm distally. Calcified moderately stenotic left common iliac artery. Moderate proximal right common iliac artery stenosis. Severe origin stenosis right internal iliac artery. Severe diffuse disease left internal iliac artery. Widely patent proximal left external iliac artery stent. The external iliac arteries are mildly and diffusely disease without significant stenosis. CTA RIGHT LEG: Mild CFA stenosis. Widely patent PFA. Long segment right SFA stents which are patent. Flow lumen is difficult to  visualize due to extensive metal artifact. There is a least mild in-stent stenosis. The distal SFA and popliteal artery are widely patent. There is two-vessel runoff via the anterior tibial and peroneal arteries. The posterior tibial artery is occluded proximally and reconstituted distally. CTA LEFT LEG: Bulky calcified plaque and mild-moderate left CFA stenosis. The PFA is patent. SFA is patent. Mid SFA stents with evidence of severe in-stent stenosis. Moderately disease patent distal SFA. The popliteal artery is patent. Appears to be three-vessel runoff. ABDOMEN AND PELVIS: The liver, spleen, gallbladder, pancreas and adrenal glands are unremarkable. Normal right kidney. Cortical scarring mid pole left kidney. No adenopathy. Normal urinary bladder.     1.  Moderate to severe celiac and SMA origin stenoses. 2.  Severe left renal artery origin stenosis. 3.  Concentric calcified narrowing of the distal abdominal aorta. 4.  Moderate bilateral common iliac artery stenoses. Severe bilateral internal iliac artery stenosis. Widely patent left external iliac artery stent. 5.  Right leg: Long segment SFA stents which are patent. Flow lumen is not adequately visualized. The distal SFA and popliteal are patent and there is two-vessel runoff. 6.  Left leg: Bulky calcified plaque with mild-moderate stenosis of the CFA. Patent PFA. Patent SFA with mid SFA stents with evidence of severe in-stent stenosis. Moderately diseased distal SFA with patent popliteal artery. There appears to be three-vessel runoff. Proximal trifurcation flow lumen difficult to assess due to venous contamination.      Lab Review:    Lab Results   Component Value Date     05/26/2017    K 4.7 05/26/2017     05/26/2017    CO2 25 05/26/2017    BUN 27 (H) 05/26/2017    CREATININE 1.06 05/26/2017    CALCIUM 9.3 05/26/2017     Lab Results   Component Value Date    WBC 11.2 (H) 05/26/2017    HGB 12.3 05/26/2017    HCT 36.9 05/26/2017    MCV 96  05/26/2017     05/26/2017     Lab Results   Component Value Date    CHOL 145 03/29/2017    TRIG 95 03/29/2017    HDL 47 (L) 03/29/2017    LDLDIRECT 125 07/10/2012     LDL Calculated (mg/dL)   Date Value   03/29/2017 79   03/23/2016 19   01/09/2015 165 (H)     BNP (pg/mL)   Date Value   01/24/2010 1132 (H)   01/21/2010 1207 (H)           Much or all of the text in this note was generated through the use of the Dragon Dictate voice-to-text software. Errors in spelling or words which seem out of context are unintentional. Sound alike errors, in particular, may have escaped editing.

## 2021-07-03 NOTE — ADDENDUM NOTE
Addendum Note by Tee Kumar MA at 6/30/2017 11:50 AM     Author: Tee Kumar MA Service: -- Author Type: Medical Assistant    Filed: 6/30/2017 11:50 AM Encounter Date: 6/14/2017 Status: Signed    : Tee Kumar MA (Medical Assistant)    Addended by: TEE KUMAR on: 6/30/2017 11:50 AM        Modules accepted: Orders

## 2021-07-03 NOTE — ADDENDUM NOTE
Addendum Note by Tee Kumar MA at 6/16/2017  2:40 PM     Author: Tee Kumar MA Service: -- Author Type: Medical Assistant    Filed: 6/16/2017  2:40 PM Encounter Date: 6/14/2017 Status: Signed    : Tee Kumar MA (Medical Assistant)    Addended by: TEE KUMAR on: 6/16/2017 02:40 PM        Modules accepted: Orders

## 2021-07-03 NOTE — ADDENDUM NOTE
Addendum Note by Tee Kumar MA at 6/16/2017 12:40 PM     Author: Tee Kumar MA Service: -- Author Type: Medical Assistant    Filed: 6/16/2017 12:40 PM Encounter Date: 6/14/2017 Status: Signed    : Tee Kumar MA (Medical Assistant)    Addended by: TEE KUMAR on: 6/16/2017 12:40 PM        Modules accepted: Orders

## 2021-07-14 PROBLEM — I50.9 CHF (CONGESTIVE HEART FAILURE) (H): Status: RESOLVED | Noted: 2017-01-01 | Resolved: 2017-01-01

## 2021-07-14 PROBLEM — I50.21 ACUTE SYSTOLIC HEART FAILURE (H): Status: RESOLVED | Noted: 2017-01-01 | Resolved: 2017-01-01

## 2021-07-14 PROBLEM — I21.4 ACUTE NON-ST ELEVATION MYOCARDIAL INFARCTION (NSTEMI) (H): Chronic | Status: RESOLVED | Noted: 2017-01-01 | Resolved: 2017-01-01

## 2021-08-03 PROBLEM — I73.9 PAD (PERIPHERAL ARTERY DISEASE) (H): Chronic | Status: RESOLVED | Noted: 2017-01-01 | Resolved: 2017-01-01

## 2024-10-23 NOTE — ASSESSMENT & PLAN NOTE
Fair controlon current regimen, with A1C today 7.6.  It sounds like she is still getting some hypoglycemia and then over-treating with too much sugar.  She will try to drink less juice for hypoglycemai to try to avoid erratic blood sugars.  I would like to see her again in 4-6 months. I would normally prefer 3m f/u, but pt prefers to extend as long as possible.   [facility] provides services at a reduced cost to those who are determined to be eligible through [facility]’s financial assistance program. Information regarding [facility]’s financial assistance program can be found by going to [email or web address] or by calling [phone].